# Patient Record
Sex: FEMALE | Race: WHITE | NOT HISPANIC OR LATINO | Employment: FULL TIME | URBAN - METROPOLITAN AREA
[De-identification: names, ages, dates, MRNs, and addresses within clinical notes are randomized per-mention and may not be internally consistent; named-entity substitution may affect disease eponyms.]

---

## 2017-10-24 ENCOUNTER — CONVERSION ENCOUNTER (OUTPATIENT)
Dept: OTHER | Facility: HOSPITAL | Age: 57
End: 2017-10-24

## 2018-02-19 ENCOUNTER — OFFICE VISIT CONVERTED (OUTPATIENT)
Dept: FAMILY MEDICINE CLINIC | Age: 58
End: 2018-02-19
Attending: NURSE PRACTITIONER

## 2018-04-18 ENCOUNTER — OFFICE VISIT CONVERTED (OUTPATIENT)
Dept: FAMILY MEDICINE CLINIC | Age: 58
End: 2018-04-18
Attending: NURSE PRACTITIONER

## 2018-04-19 LAB
ALBUMIN SERPL-MCNC: 4.6 G/DL
ALBUMIN/GLOB SERPL: 1.6 {RATIO}
ALP SERPL-CCNC: 96 IU/L
ALT SERPL-CCNC: 21 IU/L
AST SERPL-CCNC: 19 IU/L
BILIRUB SERPL-MCNC: 1.1 MG/DL
BUN SERPL-MCNC: 15 MG/DL
BUN/CREAT SERPL: 19
CALCIUM SERPL-MCNC: 10.1 MG/DL
CHLORIDE SERPL-SCNC: 101 MMOL/L
CHOLEST SERPL-MCNC: 197 MG/DL
CO2 SERPL-SCNC: 24 MMOL/L
CONV TOTAL PROTEIN: 7.4 G/DL
CREAT UR-MCNC: 0.77 MG/DL
GLOBULIN UR ELPH-MCNC: 2.8 G/DL
GLUCOSE SERPL-MCNC: 101 MG/DL
HDLC SERPL-MCNC: 35 MG/DL
LDLC SERPL CALC-MCNC: 115 MG/DL
POTASSIUM SERPL-SCNC: 4.9 MMOL/L
SODIUM SERPL-SCNC: 141 MMOL/L
TRIGL SERPL-MCNC: 235 MG/DL
VLDLC SERPL-MCNC: 47 MG/DL

## 2018-07-28 ENCOUNTER — OFFICE VISIT CONVERTED (OUTPATIENT)
Dept: FAMILY MEDICINE CLINIC | Age: 58
End: 2018-07-28
Attending: NURSE PRACTITIONER

## 2018-10-23 ENCOUNTER — OFFICE VISIT CONVERTED (OUTPATIENT)
Dept: FAMILY MEDICINE CLINIC | Age: 58
End: 2018-10-23
Attending: NURSE PRACTITIONER

## 2018-11-06 ENCOUNTER — CONVERSION ENCOUNTER (OUTPATIENT)
Dept: OTHER | Facility: HOSPITAL | Age: 58
End: 2018-11-06

## 2018-11-19 ENCOUNTER — CONVERSION ENCOUNTER (OUTPATIENT)
Dept: OTHER | Facility: HOSPITAL | Age: 58
End: 2018-11-19

## 2018-12-10 ENCOUNTER — OFFICE VISIT CONVERTED (OUTPATIENT)
Dept: FAMILY MEDICINE CLINIC | Age: 58
End: 2018-12-10
Attending: NURSE PRACTITIONER

## 2019-02-22 ENCOUNTER — OFFICE VISIT CONVERTED (OUTPATIENT)
Dept: FAMILY MEDICINE CLINIC | Age: 59
End: 2019-02-22
Attending: NURSE PRACTITIONER

## 2019-03-05 ENCOUNTER — OFFICE VISIT CONVERTED (OUTPATIENT)
Dept: FAMILY MEDICINE CLINIC | Age: 59
End: 2019-03-05
Attending: NURSE PRACTITIONER

## 2019-04-01 ENCOUNTER — HOSPITAL ENCOUNTER (OUTPATIENT)
Dept: OTHER | Facility: HOSPITAL | Age: 59
Discharge: HOME | End: 2019-04-01
Attending: NURSE PRACTITIONER

## 2019-04-01 LAB
ALBUMIN SERPL-MCNC: 4.4 G/DL (ref 3.5–5)
ALBUMIN/GLOB SERPL: 1.4 {RATIO} (ref 1.4–2.6)
ALP SERPL-CCNC: 82 U/L (ref 53–141)
ALT SERPL-CCNC: 15 U/L (ref 10–40)
ANION GAP SERPL CALC-SCNC: 16 MMOL/L (ref 8–19)
AST SERPL-CCNC: 16 U/L (ref 15–50)
BILIRUB SERPL-MCNC: 1.12 MG/DL (ref 0.2–1.3)
BUN SERPL-MCNC: 14 MG/DL (ref 5–25)
BUN/CREAT SERPL: 17 {RATIO} (ref 6–20)
CALCIUM SERPL-MCNC: 9.6 MG/DL (ref 8.7–10.4)
CHLORIDE SERPL-SCNC: 102 MMOL/L (ref 99–111)
CHOLEST SERPL-MCNC: 218 MG/DL (ref 107–200)
CHOLEST/HDLC SERPL: 6.1 {RATIO} (ref 3–6)
CONV CO2: 27 MMOL/L (ref 22–32)
CONV TOTAL PROTEIN: 7.5 G/DL (ref 6.3–8.2)
CREAT UR-MCNC: 0.83 MG/DL (ref 0.5–0.9)
GFR SERPLBLD BASED ON 1.73 SQ M-ARVRAT: >60 ML/MIN/{1.73_M2}
GLOBULIN UR ELPH-MCNC: 3.1 G/DL (ref 2–3.5)
GLUCOSE SERPL-MCNC: 104 MG/DL (ref 65–99)
HDLC SERPL-MCNC: 36 MG/DL (ref 40–60)
LDLC SERPL CALC-MCNC: 117 MG/DL (ref 70–100)
OSMOLALITY SERPL CALC.SUM OF ELEC: 293 MOSM/KG (ref 273–304)
POTASSIUM SERPL-SCNC: 4.4 MMOL/L (ref 3.5–5.3)
SODIUM SERPL-SCNC: 141 MMOL/L (ref 135–147)
TRIGL SERPL-MCNC: 323 MG/DL (ref 40–150)
VLDLC SERPL-MCNC: 65 MG/DL (ref 5–37)

## 2019-04-23 ENCOUNTER — OFFICE VISIT CONVERTED (OUTPATIENT)
Dept: FAMILY MEDICINE CLINIC | Age: 59
End: 2019-04-23
Attending: NURSE PRACTITIONER

## 2019-07-02 ENCOUNTER — HOSPITAL ENCOUNTER (OUTPATIENT)
Dept: OTHER | Facility: HOSPITAL | Age: 59
Discharge: HOME | End: 2019-07-02
Attending: NURSE PRACTITIONER

## 2019-07-02 LAB
ALBUMIN SERPL-MCNC: 4.4 G/DL (ref 3.5–5)
ALBUMIN/GLOB SERPL: 1.3 {RATIO} (ref 1.4–2.6)
ALP SERPL-CCNC: 86 U/L (ref 53–141)
ALT SERPL-CCNC: 17 U/L (ref 10–40)
ANION GAP SERPL CALC-SCNC: 18 MMOL/L (ref 8–19)
AST SERPL-CCNC: 18 U/L (ref 15–50)
BILIRUB SERPL-MCNC: 1.19 MG/DL (ref 0.2–1.3)
BUN SERPL-MCNC: 15 MG/DL (ref 5–25)
BUN/CREAT SERPL: 17 {RATIO} (ref 6–20)
CALCIUM SERPL-MCNC: 9.7 MG/DL (ref 8.7–10.4)
CHLORIDE SERPL-SCNC: 102 MMOL/L (ref 99–111)
CHOLEST SERPL-MCNC: 199 MG/DL (ref 107–200)
CHOLEST/HDLC SERPL: 5.4 {RATIO} (ref 3–6)
CONV CO2: 25 MMOL/L (ref 22–32)
CONV TOTAL PROTEIN: 7.7 G/DL (ref 6.3–8.2)
CREAT UR-MCNC: 0.88 MG/DL (ref 0.5–0.9)
EST. AVERAGE GLUCOSE BLD GHB EST-MCNC: 123 MG/DL
GFR SERPLBLD BASED ON 1.73 SQ M-ARVRAT: >60 ML/MIN/{1.73_M2}
GLOBULIN UR ELPH-MCNC: 3.3 G/DL (ref 2–3.5)
GLUCOSE SERPL-MCNC: 100 MG/DL (ref 65–99)
HBA1C MFR BLD: 5.9 % (ref 3.5–5.7)
HDLC SERPL-MCNC: 37 MG/DL (ref 40–60)
LDLC SERPL CALC-MCNC: 112 MG/DL (ref 70–100)
OSMOLALITY SERPL CALC.SUM OF ELEC: 293 MOSM/KG (ref 273–304)
POTASSIUM SERPL-SCNC: 4.2 MMOL/L (ref 3.5–5.3)
SODIUM SERPL-SCNC: 141 MMOL/L (ref 135–147)
TRIGL SERPL-MCNC: 249 MG/DL (ref 40–150)
VLDLC SERPL-MCNC: 50 MG/DL (ref 5–37)

## 2019-10-07 ENCOUNTER — OFFICE VISIT CONVERTED (OUTPATIENT)
Dept: FAMILY MEDICINE CLINIC | Age: 59
End: 2019-10-07
Attending: NURSE PRACTITIONER

## 2019-11-12 ENCOUNTER — HOSPITAL ENCOUNTER (OUTPATIENT)
Dept: OTHER | Facility: HOSPITAL | Age: 59
Discharge: HOME | End: 2019-11-12
Attending: NURSE PRACTITIONER

## 2019-11-12 ENCOUNTER — OFFICE VISIT CONVERTED (OUTPATIENT)
Dept: FAMILY MEDICINE CLINIC | Age: 59
End: 2019-11-12
Attending: NURSE PRACTITIONER

## 2019-11-18 LAB
CONV LAST MENSTURAL PERIOD: NORMAL
SPECIMEN SOURCE: NORMAL
SPECIMEN SOURCE: NORMAL
THIN PREP CVX: NORMAL

## 2019-11-25 ENCOUNTER — OFFICE VISIT CONVERTED (OUTPATIENT)
Dept: FAMILY MEDICINE CLINIC | Age: 59
End: 2019-11-25
Attending: NURSE PRACTITIONER

## 2020-04-22 ENCOUNTER — OFFICE VISIT CONVERTED (OUTPATIENT)
Dept: FAMILY MEDICINE CLINIC | Age: 60
End: 2020-04-22
Attending: NURSE PRACTITIONER

## 2020-04-23 ENCOUNTER — HOSPITAL ENCOUNTER (OUTPATIENT)
Dept: OTHER | Facility: HOSPITAL | Age: 60
Discharge: HOME | End: 2020-04-23
Attending: NURSE PRACTITIONER

## 2020-04-23 LAB
ALBUMIN SERPL-MCNC: 4.3 G/DL (ref 3.5–5)
ALBUMIN/GLOB SERPL: 1.4 {RATIO} (ref 1.4–2.6)
ALP SERPL-CCNC: 83 U/L (ref 53–141)
ALT SERPL-CCNC: 19 U/L (ref 10–40)
ANION GAP SERPL CALC-SCNC: 16 MMOL/L (ref 8–19)
AST SERPL-CCNC: 19 U/L (ref 15–50)
BILIRUB SERPL-MCNC: 1.28 MG/DL (ref 0.2–1.3)
BUN SERPL-MCNC: 20 MG/DL (ref 5–25)
BUN/CREAT SERPL: 23 {RATIO} (ref 6–20)
CALCIUM SERPL-MCNC: 9.6 MG/DL (ref 8.7–10.4)
CHLORIDE SERPL-SCNC: 101 MMOL/L (ref 99–111)
CHOLEST SERPL-MCNC: 214 MG/DL (ref 107–200)
CHOLEST/HDLC SERPL: 6.1 {RATIO} (ref 3–6)
CONV CO2: 26 MMOL/L (ref 22–32)
CONV TOTAL PROTEIN: 7.4 G/DL (ref 6.3–8.2)
CREAT UR-MCNC: 0.86 MG/DL (ref 0.5–0.9)
GFR SERPLBLD BASED ON 1.73 SQ M-ARVRAT: >60 ML/MIN/{1.73_M2}
GLOBULIN UR ELPH-MCNC: 3.1 G/DL (ref 2–3.5)
GLUCOSE SERPL-MCNC: 101 MG/DL (ref 65–99)
HDLC SERPL-MCNC: 35 MG/DL (ref 40–60)
LDLC SERPL CALC-MCNC: 132 MG/DL (ref 70–100)
OSMOLALITY SERPL CALC.SUM OF ELEC: 291 MOSM/KG (ref 273–304)
POTASSIUM SERPL-SCNC: 4.1 MMOL/L (ref 3.5–5.3)
SODIUM SERPL-SCNC: 139 MMOL/L (ref 135–147)
TRIGL SERPL-MCNC: 233 MG/DL (ref 40–150)
VLDLC SERPL-MCNC: 47 MG/DL (ref 5–37)

## 2020-10-19 ENCOUNTER — OFFICE VISIT CONVERTED (OUTPATIENT)
Dept: FAMILY MEDICINE CLINIC | Age: 60
End: 2020-10-19
Attending: NURSE PRACTITIONER

## 2020-10-19 ENCOUNTER — HOSPITAL ENCOUNTER (OUTPATIENT)
Dept: OTHER | Facility: HOSPITAL | Age: 60
Discharge: HOME | End: 2020-10-19
Attending: NURSE PRACTITIONER

## 2020-10-19 LAB
ALBUMIN SERPL-MCNC: 4.7 G/DL (ref 3.5–5)
ALBUMIN/GLOB SERPL: 1.5 {RATIO} (ref 1.4–2.6)
ALP SERPL-CCNC: 91 U/L (ref 53–141)
ALT SERPL-CCNC: 21 U/L (ref 10–40)
ANION GAP SERPL CALC-SCNC: 17 MMOL/L (ref 8–19)
AST SERPL-CCNC: 17 U/L (ref 15–50)
BASOPHILS # BLD AUTO: 0.04 10*3/UL (ref 0–0.2)
BASOPHILS NFR BLD AUTO: 0.4 % (ref 0–3)
BILIRUB SERPL-MCNC: 0.71 MG/DL (ref 0.2–1.3)
BUN SERPL-MCNC: 17 MG/DL (ref 5–25)
BUN/CREAT SERPL: 24 {RATIO} (ref 6–20)
CALCIUM SERPL-MCNC: 10.1 MG/DL (ref 8.7–10.4)
CHLORIDE SERPL-SCNC: 102 MMOL/L (ref 99–111)
CONV ABS IMM GRAN: 0.02 10*3/UL (ref 0–0.2)
CONV CO2: 23 MMOL/L (ref 22–32)
CONV IMMATURE GRAN: 0.2 % (ref 0–1.8)
CONV TOTAL PROTEIN: 7.8 G/DL (ref 6.3–8.2)
CREAT UR-MCNC: 0.71 MG/DL (ref 0.5–0.9)
DEPRECATED RDW RBC AUTO: 41.4 FL (ref 36.4–46.3)
EOSINOPHIL # BLD AUTO: 0.07 10*3/UL (ref 0–0.7)
EOSINOPHIL # BLD AUTO: 0.8 % (ref 0–7)
ERYTHROCYTE [DISTWIDTH] IN BLOOD BY AUTOMATED COUNT: 12.7 % (ref 11.7–14.4)
EST. AVERAGE GLUCOSE BLD GHB EST-MCNC: 128 MG/DL
GFR SERPLBLD BASED ON 1.73 SQ M-ARVRAT: >60 ML/MIN/{1.73_M2}
GLOBULIN UR ELPH-MCNC: 3.1 G/DL (ref 2–3.5)
GLUCOSE SERPL-MCNC: 97 MG/DL (ref 65–99)
HBA1C MFR BLD: 6.1 % (ref 3.5–5.7)
HCT VFR BLD AUTO: 40.1 % (ref 37–47)
HGB BLD-MCNC: 13.1 G/DL (ref 12–16)
LYMPHOCYTES # BLD AUTO: 2.18 10*3/UL (ref 1–5)
LYMPHOCYTES NFR BLD AUTO: 24.4 % (ref 20–45)
MCH RBC QN AUTO: 29 PG (ref 27–31)
MCHC RBC AUTO-ENTMCNC: 32.7 G/DL (ref 33–37)
MCV RBC AUTO: 88.7 FL (ref 81–99)
MONOCYTES # BLD AUTO: 0.4 10*3/UL (ref 0.2–1.2)
MONOCYTES NFR BLD AUTO: 4.5 % (ref 3–10)
NEUTROPHILS # BLD AUTO: 6.21 10*3/UL (ref 2–8)
NEUTROPHILS NFR BLD AUTO: 69.7 % (ref 30–85)
NRBC CBCN: 0 % (ref 0–0.7)
OSMOLALITY SERPL CALC.SUM OF ELEC: 287 MOSM/KG (ref 273–304)
PLATELET # BLD AUTO: 261 10*3/UL (ref 130–400)
PMV BLD AUTO: 10.1 FL (ref 9.4–12.3)
POTASSIUM SERPL-SCNC: 4.3 MMOL/L (ref 3.5–5.3)
RBC # BLD AUTO: 4.52 10*6/UL (ref 4.2–5.4)
SODIUM SERPL-SCNC: 138 MMOL/L (ref 135–147)
WBC # BLD AUTO: 8.92 10*3/UL (ref 4.8–10.8)

## 2021-04-26 ENCOUNTER — HOSPITAL ENCOUNTER (OUTPATIENT)
Dept: OTHER | Facility: HOSPITAL | Age: 61
Discharge: HOME | End: 2021-04-26
Attending: NURSE PRACTITIONER

## 2021-04-26 ENCOUNTER — OFFICE VISIT CONVERTED (OUTPATIENT)
Dept: FAMILY MEDICINE CLINIC | Age: 61
End: 2021-04-26
Attending: NURSE PRACTITIONER

## 2021-04-26 LAB
ANION GAP SERPL CALC-SCNC: 15 MMOL/L (ref 8–19)
BUN SERPL-MCNC: 16 MG/DL (ref 5–25)
BUN/CREAT SERPL: 20 {RATIO} (ref 6–20)
CALCIUM SERPL-MCNC: 9.4 MG/DL (ref 8.7–10.4)
CHLORIDE SERPL-SCNC: 100 MMOL/L (ref 99–111)
CONV CO2: 25 MMOL/L (ref 22–32)
CREAT UR-MCNC: 0.82 MG/DL (ref 0.5–0.9)
EST. AVERAGE GLUCOSE BLD GHB EST-MCNC: 128 MG/DL
GFR SERPLBLD BASED ON 1.73 SQ M-ARVRAT: >60 ML/MIN/{1.73_M2}
GLUCOSE SERPL-MCNC: 97 MG/DL (ref 65–99)
HBA1C MFR BLD: 6.1 % (ref 3.5–5.7)
OSMOLALITY SERPL CALC.SUM OF ELEC: 283 MOSM/KG (ref 273–304)
POTASSIUM SERPL-SCNC: 4.2 MMOL/L (ref 3.5–5.3)
SODIUM SERPL-SCNC: 136 MMOL/L (ref 135–147)

## 2021-05-18 NOTE — PROGRESS NOTES
Anabelle Coleman  1960     Office/Outpatient Visit    Visit Date:  03:01 pm    Provider: Catherine Burgos N.P. (Assistant: Antonieta Hull,  )    Location: [facility]        Electronically signed by Catherine Burgos N.P. on  2021 03:28:35 PM                             Subjective:        CC: Olga Lidia is a 60 year old White female.  This is a follow-up visit.  Med refill;         HPI:           Patient to be evaluated for essential (primary) hypertension.  Her current cardiac medication regimen includes an ACE inhibitor ( Zestril ).  Olga Lidia does not check her blood pressure other than at her clinic appointments.  She is tolerating the medication well without side effects.  Compliance with treatment has been good; she takes her medication as directed.  Exercises daily.      ROS:     CONSTITUTIONAL:  Negative for fever.      CARDIOVASCULAR:  Negative for chest pain, palpitations, tachycardia, orthopnea, and edema.      RESPIRATORY:  Negative for cough, dyspnea, and hemoptysis.      NEUROLOGICAL:  Negative for dizziness, headaches, paresthesias, and weakness.          Past Medical History / Family History / Social History:         Last Reviewed on 2021 03:09 PM by Catherine Burgos    Past Medical History:                 PAST MEDICAL HISTORY             GYNECOLOGICAL HISTORY:        Contraception: S/P tubal ligation;    Menopause at age 52.    Last Pap was 19         PREVENTIVE HEALTH MAINTENANCE             Hepatitis C Medicare Screening: was last done 10/2008 negative     MAMMOGRAM: Done within last 2 years and results in are chart was last done 19         Surgical History:         cyst removed from breast; Procedures: colonoscopy 12-31-10/hemorrhoids/diverticulosis         Family History:     Father: Coronary Artery Disease ( stents placed/3 V CABG ); Hypertension     Mother: Hypertension; Hyperlipidemia     Brother(s): 1 brother(s) total;  smoker      Sister(s): 4 sister(s) total; 1, was stillborn      Maternal Grandmother: Breast Cancer         Social History:     Occupation: [employer]      Marital Status:      Children: 2 children         Tobacco/Alcohol/Supplements:     Last Reviewed on 2021 03:05 PM by Antonieta Hull    Tobacco: She has never smoked.          Alcohol: Frequency: Socially         Supplements:  Patient admits to use of multivitamin.          Immunizations:     Havrix -adult dose (HepA) 5/15/2018    VAQTA -adult dose (HepA) 2018    Fluzone (3 + years dose) 2017    Fluzone Quadrivalent (3+ years) 10/9/2018    Fluzone Quadrivalent (3+ years) 2019    Afluria Quadrivalent 2018    Adacel (Tdap) 2008    COVID-19, mRNA, LNP-S, PF, 100 mcg/0.5 mL dose 3/5/2021    COVID-19, mRNA, LNP-S, PF, 100 mcg/0.5 mL dose 2021    influenza, injectable, quadrivalent, preservative free 10/1/2020        Allergies:     Last Reviewed on 2021 03:03 PM by Antonieta Hull    No Known Allergies.        Current Medications:     Last Reviewed on 2021 03:03 PM by Antonieta Hull    multivitamin daily     Lisinopril 10 mg oral tablet [1 tab daily]        Objective:        Vitals:         Current: 2021 3:13:23 PM    Ht:  5 ft, 1.25 in;  Wt: 143 lbs;  BMI: 26.8T: 98 F (temporal);  BP: 146/93 mm Hg (left arm, sitting);  P: 74 bpm (left arm (BP Cuff), sitting);  sCr: 0.71 mg/dL;  GFR: 79.45        Repeat:     3:22:56 PM  BP:   151/87mm Hg (right arm, sitting, HR: 71)     Exams:     PHYSICAL EXAM:     GENERAL: vital signs recorded - well developed, well nourished;  no apparent distress;     NECK: carotid exam reveals no bruits;     RESPIRATORY: normal respiratory rate and pattern with no distress; normal breath sounds with no rales, rhonchi, wheezes or rubs;     CARDIOVASCULAR: normal rate; rhythm is regular;  no systolic murmur; no edema;     PSYCHIATRIC:  appropriate affect and  demeanor; normal speech pattern; grossly normal memory;         Assessment:         I10   Essential (primary) hypertension           ORDERS:         Meds Prescribed:       [Refilled] Lisinopril 10 mg oral tablet [1 tab daily], #90 (ninety) tablets, Refills: 1 (one)         Lab Orders:       [id number]  [facility] Basic Metabolic Panel  (Send-Out)            [id number]  [facility] Hemoglobin A1C  (Send-Out)                      Plan:         Essential (primary) hypertensionreviewed last few labs, would prefer getting her labs done today, she is not fasting     LABORATORY:  Labs ordered to be performed today include basic metabolic panel and HgbA1C.      RECOMMENDATIONS given include: perform routine monitoring of blood pressure with home blood pressure cuff, exercise, reduction of dietary salt intake, and eat healthy.      FOLLOW-UP: pending labs           Prescriptions:       [Refilled] Lisinopril 10 mg oral tablet [1 tab daily], #90 (ninety) tablets, Refills: 1 (one)           Orders:       [id number]  [facility] Basic Metabolic Panel  (Send-Out)            16907  [facility] Hemoglobin A1C  (Send-Out)                  Patient Recommendations:        For  Essential (primary) hypertension:    Begin monitoring your blood pressure by brief nurse visits at our office, a home blood pressure monitor, or by checking on the machines in pharmacies or stores.  Keep a log of the readings. Maintain a regular exercise program. Reduce the amount of salt in your diet.              Charge Capture:         Primary Diagnosis:     I10  Essential (primary) hypertension           Orders:      [id number]  Office/outpatient visit; established patient, level 3  (In-House)

## 2021-05-18 NOTE — PROGRESS NOTES
Anabelle Coleman  1960     Office/Outpatient Visit    Visit Date:  01:36 pm    Provider: Catherine Burgos N.P. (Assistant: Kalli Rodriguez MA)    Location: [facility]        Electronically signed by Catherine Burgos N.P. on  2019 02:17:10 PM                             Subjective:        CC: Olga Lidia is a 59 year old White female.  possible infected finger nail;         HPI:           Patient complains of rash and other nonspecific skin eruption.  This has been a problem for the past 2-3 weeks.  It is located primarily around the fingernails.  She describes the rash as red.  Associated symptoms include medial third finger nail, lossened and has been red and throbs.  no injury to finger/ nail, has been using hydrogen peroxide and neosporin     ROS:     CONSTITUTIONAL:  Negative for fever.      INTEGUMENTARY/BREAST:  Negative for exudate.          Past Medical History / Family History / Social History:         Last Reviewed on 2019 01:50 PM by Catherine Burgos    Past Medical History:                 PAST MEDICAL HISTORY             GYNECOLOGICAL HISTORY:        Contraception: S/P tubal ligation;    Menopause at age 52.    Last Pap was 19         PREVENTIVE HEALTH MAINTENANCE             Hepatitis C Medicare Screening: was last done 10/2008 negative     MAMMOGRAM: Done within last 2 years and results in are chart was last done 19         Surgical History:         cyst removed from breast; Procedures: colonoscopy 12-31-10/hemorrhoids/diverticulosis         Family History:     Father: Coronary Artery Disease ( stents placed/3 V CABG ); Hypertension     Mother: Hypertension; Hyperlipidemia     Brother(s): 1 brother(s) total;  smoker     Sister(s): 4 sister(s) total; 1, was stillborn      Maternal Grandmother: Breast Cancer         Social History:     Occupation: [employer]      Marital Status:       Children: 2 children         Tobacco/Alcohol/Supplements:     Last Reviewed on 11/25/2019 01:39 PM by Kalli Rodriguez    Tobacco: She has never smoked.          Alcohol: Frequency: Socially         Supplements:  Patient admits to use of multivitamin.          Substance Abuse History:     Last Reviewed on 12/10/2018 03:51 PM by Kanika Limon        Mental Health History:     Last Reviewed on 12/10/2018 03:51 PM by Kanika Limon        Communicable Diseases (eg STDs):     Last Reviewed on 12/10/2018 03:51 PM by Kanika Limon        Immunizations:     Havrix -adult dose (HepA) 5/15/2018    VAQTA -adult dose (HepA) 11/21/2018    Fluzone (3 + years dose) 11/9/2017    Fluzone Quadrivalent (3+ years) 10/9/2018    Fluzone Quadrivalent (3+ years) 9/25/2019    Afluria Quadrivalent 9/17/2018    Adacel (Tdap) 4/22/2008        Allergies:     Last Reviewed on 11/12/2019 02:02 PM by Kalli Rodriguez    No Known Allergies.        Current Medications:     Last Reviewed on 11/12/2019 02:02 PM by Kalli Rodriguez    multivitamin daily    Lisinopril 10mg Tablet [1 tab daily]        Objective:        Vitals:         Current: 11/25/2019 1:40:30 PM    Ht:  5 ft, 1.25 in;  Wt: 136.6 lbs;  BMI: 25.6T: 98 F (oral);  BP: 129/73 mm Hg (left arm, sitting);  P: 70 bpm (left arm (BP Cuff), sitting);  sCr: 0.88 mg/dL;  GFR: 63.63        Exams:     PHYSICAL EXAM:     GENERAL:  well developed and nourished; appropriately groomed; in no apparent distress;     RESPIRATORY: normal respiratory rate and pattern with no distress; normal breath sounds with no rales, rhonchi, wheezes or rubs;     CARDIOVASCULAR: normal rate; rhythm is regular;  no systolic murmur;     BREAST/INTEGUMENT: right third finger, medially nail loosened, and mild erythema, no exudate, tender to palpation;         Assessment:         R21   Rash and other nonspecific skin eruption           ORDERS:         Meds Prescribed:       [New Rx] doxycycline hyclate  100 mg oral capsule [take 1 capsule (100 mg) by oral route 2 times per day], #20 (twenty) capsules, Refills: 0 (zero)                 Plan:         Rash and other nonspecific skin eruption        RECOMMENDATIONS given include: warm epsom salt soaks.      FOLLOW-UP: Advised to call if there is no improvement in 1 week(s).            Prescriptions:       [New Rx] doxycycline hyclate 100 mg oral capsule [take 1 capsule (100 mg) by oral route 2 times per day], #20 (twenty) capsules, Refills: 0 (zero)             Charge Capture:         Primary Diagnosis:     R21  Rash and other nonspecific skin eruption           Orders:      [id number]  Office/outpatient visit; established patient, level 2 (8 minutes)  (In-House)                  ADDENDUMS:      ____________________________________    Addendum: 12/04/2019 10:45 PM - Catherine Burgos        Total time spent with patient was 8 minutes

## 2021-05-18 NOTE — PROGRESS NOTES
Anabelle Coleman  1960     Office/Outpatient Visit    Visit Date: Tue, Nov 12, 2019 01:58 pm    Provider: Catherine Burgos N.P. (Assistant: Kalli Rodriguez MA)    Location: [facility]        Electronically signed by Catherine Burgos N.P. on  11/12/2019 03:02:33 PM                             Subjective:        CC: Olga Lidia is a 59 year old White female.  She presents with WWE.          HPI:           Concerning encounter for gynecological examination (general) (routine) without abnormal findings, she is menopausal.  She performs breast self-exams occasionally.   Her last Pap smear was in 12-16-16 and was normal.   Her last mammogram was in 11-7-18 and was abnormal; follow-up mammogram 11-19-18 normal right side.   Her last DEXA was in 12-12-12 and was normal.   Preventative Health updated today.  Olga Lidia has had an abnormal Pap smear in the past.  Tobacco: She has never smoked.  She is not sexually active.        (Mild)     PHQ-9 Depression Screening: Completed form scanned and in chart; Total Score 1     ROS:     CONSTITUTIONAL:  Negative for chills, fatigue, fever, and weight change.      EYES:  Negative for blurred vision, eye pain, and photophobia.      E/N/T:  Negative for hearing problems, E/N/T pain, congestion, rhinorrhea, epistaxis, hoarseness, and dental problems.      CARDIOVASCULAR:  Negative for chest pain, palpitations, tachycardia, orthopnea, and edema.      RESPIRATORY:  Negative for cough, dyspnea, and hemoptysis.      GASTROINTESTINAL:  Negative for melena and change in BM's.      GENITOURINARY:  Negative for dysuria and vaginal discharge.      MUSCULOSKELETAL:  Negative for arthralgias, back pain, and myalgias.      INTEGUMENTARY/BREAST:  Negative for atypical moles, dry skin, pruritis, rashes, breast masses, and nipple discharge.      NEUROLOGICAL:  Negative for dizziness, headaches, paresthesias, and weakness.      ENDOCRINE:  Negative for hair loss, heat/cold  intolerance, polydipsia, and polyphagia.      PSYCHIATRIC:  Negative for anxiety, depression, and sleep disturbances.          Past Medical History / Family History / Social History:         Last Reviewed on 2019 02:33 PM by Catherine Burgos    Past Medical History:                 PAST MEDICAL HISTORY             GYNECOLOGICAL HISTORY:        Contraception: S/P tubal ligation;    Menopause at age 52.          PREVENTIVE HEALTH MAINTENANCE             Hepatitis C Medicare Screening: was last done 10/2008 negative     MAMMOGRAM: Done within last 2 years and results in are chart was last done 2018 RIGHT DIAG MAMMO FINE         Surgical History:         cyst removed from breast; Procedures: colonoscopy 12-31-10/hemorrhoids/diverticulosis         Family History:     Father: Coronary Artery Disease ( stents placed/3 V CABG ); Hypertension     Mother: Hypertension; Hyperlipidemia     Brother(s): 1 brother(s) total;  smoker     Sister(s): 4 sister(s) total; 1, was stillborn      Maternal Grandmother: Breast Cancer         Social History:     Occupation: [employer]      Marital Status:      Children: 2 children         Tobacco/Alcohol/Supplements:     Last Reviewed on 2019 02:02 PM by Kalli Rodriguez    Tobacco: She has never smoked.          Alcohol: Frequency: Socially         Supplements:  Patient admits to use of multivitamin.          Substance Abuse History:     Last Reviewed on 12/10/2018 03:51 PM by Kanika Limon        Mental Health History:     Last Reviewed on 12/10/2018 03:51 PM by Kanika Limon        Communicable Diseases (eg STDs):     Last Reviewed on 12/10/2018 03:51 PM by Kanika Limon        Immunizations:     Havrix -adult dose (HepA) 5/15/2018    VAQTA -adult dose (HepA) 2018    Fluzone (3 + years dose) 2017    Fluzone Quadrivalent (3+ years) 10/9/2018    Fluzone Quadrivalent (3+ years) 2019     Afluria Quadrivalent 9/17/2018    Adacel (Tdap) 4/22/2008        Allergies:     Last Reviewed on 11/12/2019 02:02 PM by Kalli Rodriguez    No Known Allergies.        Current Medications:     Last Reviewed on 11/12/2019 02:02 PM by Kalli Rodriguez    multivitamin daily    Lisinopril 10mg Tablet [1 tab daily]        Objective:        Vitals:         Current: 11/12/2019 2:06:12 PM    Ht:  5 ft, 1.25 in;  Wt: 136.2 lbs;  BMI: 25.5T: 98.1 F (oral);  BP: 118/72 mm Hg (left arm, sitting);  P: 69 bpm (left arm (BP Cuff), sitting);  sCr: 0.88 mg/dL;  GFR: 63.55        Exams:     PHYSICAL EXAM:     GENERAL: vital signs recorded - well developed, well nourished;  no apparent distress;     EYES: extraocular movements intact; conjunctiva and cornea are normal; PERRLA;     E/N/T:  normal EACs, TMs, nasal/oral mucosa, teeth, gingiva, and oropharynx;     NECK: range of motion is normal; thyroid is non-palpable;     RESPIRATORY: normal respiratory rate and pattern with no distress; normal breath sounds with no rales, rhonchi, wheezes or rubs;     CARDIOVASCULAR: normal rate; rhythm is regular;  no systolic murmur; no edema;     GASTROINTESTINAL: nontender; normal bowel sounds; no organomegaly; rectal exam: normal tone; nontender, guaiac negative stool;     GENITOURINARY: Pap smear taken;  external genitalia: normal without lesions or urethral abnormalities;; vagina: atrophic mucosa; ;  cervix: normal appearance without lesions or discharge;;  uterus: normal size and position, well-supported;;  adnexa: normal with no masses or tenderness     LYMPHATIC: no enlargement of cervical or facial nodes; no axillary adenopathy;     BREAST/INTEGUMENT: BREASTS: breast exam is normal without masses, skin changes, or nipple discharge; SKIN: no significant rashes or lesions; no suspicious moles;     MUSCULOSKELETAL:  Normal range of motion, strength and tone;     NEUROLOGIC: GROSSLY INTACT     PSYCHIATRIC:  appropriate affect and  demeanor; normal speech pattern; grossly normal memory;         Assessment:         V72.31   Well Woman Exam       Z01.419   Encounter for gynecological examination (general) (routine) without abnormal findings       V79.0   Screening for depression   (Mild)     Z13.31   Encounter for screening for depression       Z12.12   Encounter for screening for malignant neoplasm of rectum           ORDERS:         Radiology/Test Orders:       [id number]  Screening digital breast tomosynthesis bi  (Send-Out)              Lab Orders:       [id number]  Cytopathology, slides, cervical or vaginal; manual screening under MD supervision  (Send-Out)            [id number]  Occult blood, fecal  (In-House)              Procedures Ordered:       [id number]  Handlg&/or convey of spec for TR office to lab  (In-House)              Other Orders:         Depression screen negative  (In-House)                      Plan:         Well Woman Examshe has an appt at 3 today for her mammogram         RADIOLOGY:  I have ordered Mammogram Bilateral Screening 3D to be done today.      COUNSELING was provided today regarding the following topics: breast self-exam.            Orders:       [id number]  Cytopathology, slides, cervical or vaginal; manual screening under MD supervision  (Send-Out)            [id number]  Screening digital breast tomosynthesis bi  (Send-Out)            12788  Handlg&/or convey of spec for TR office to lab  (In-House)              Encounter for screening for depression    MIPS PHQ-9 Depression Screening: Completed form scanned and in chart; Total Score 1; Negative Depression Screen           Orders:         Depression screen negative  (In-House)              Encounter for screening for malignant neoplasm of rectum          Orders:       [id number]  Occult blood, fecal  (In-House)      X 1 @ [facility]            Patient Recommendations:        For  Well Woman Exam:        You should regularly examine your breasts, easily done while in the shower or with  lotion.  Feel and look for differences in consistency from month to month, especially noting knots or lumps, changes in skin appearance, nipple retraction or discharge.              Charge Capture:         Primary Diagnosis:     V72.31  Well Woman Exam           Orders:      [id number]  Preventive medicine, established patient, age 40-64 years  (In-House)            31972  Handlg&/or convey of spec for TR office to lab  (In-House)              Z01.419  Encounter for gynecological examination (general) (routine) without abnormal findings     V79.0  Screening for depression     Z13.31  Encounter for screening for depression           Orders:        Depression screen negative  (In-House)              Z12.12  Encounter for screening for malignant neoplasm of rectum           Orders:      [id number]  Occult blood, fecal  (In-House)

## 2021-05-18 NOTE — PROGRESS NOTES
Anabelle Coleman 1960     Office/Outpatient Visit    Visit Date: Mon, Dec 10, 2018 03:32 pm    Provider: Kanika Limon N.P. (Assistant: Breanne Knox MA)    Location: [facility]        Electronically signed by Kanika Limon N.P. on  12/10/2018 04:40:53 PM                             SUBJECTIVE:        CC:     Olga Lidia is a 58 year old White female.  presents today due to complaints of cough X 2 weeks         HPI:         Patient complains of cough.  States productive green cough x 2 weeks. Denies fever, headache, ear pain, or sore throat. Taking claritin, nyquil, delsym without relief.      ROS:     CONSTITUTIONAL:  Negative for chills, fatigue, fever, and weight change.      EYES:  Negative for blurred vision.      CARDIOVASCULAR:  Negative for chest pain, orthopnea, paroxysmal nocturnal dyspnea and pedal edema.      RESPIRATORY:  Negative for dyspnea.      GASTROINTESTINAL:  Negative for abdominal pain, constipation, diarrhea, nausea and vomiting.      NEUROLOGICAL:  Negative for dizziness, headaches, paresthesias, and weakness.      PSYCHIATRIC:  Negative for anxiety, depression, and sleep disturbances.          PMH/FMH/SH:     Last Reviewed on 12/10/2018 03:51 PM by Kanika Limon    Past Medical History:                 PAST MEDICAL HISTORY             GYNECOLOGICAL HISTORY:        Contraception: S/P tubal ligation;    Menopause at age 52.          PREVENTIVE HEALTH MAINTENANCE             Hepatitis C Medicare Screening: was last done 10/2008 negative     MAMMOGRAM: Done within last 2 years and results in are chart was last done 2018 RIGHT DIAG MAMMO FINE         Surgical History:         cyst removed from breast; Procedures: colonoscopy 12-31-10/hemorrhoids/diverticulosis         Family History:     Father: Coronary Artery Disease ( stents placed/3 V CABG ); Hypertension     Mother: Hypertension; Hyperlipidemia     Brother(s): 1 brother(s) total;  smoker     Sister(s):  4 sister(s) total; 1, was stillborn      Maternal Grandmother: Breast Cancer         Social History:     Occupation: [employer]      Marital Status:      Children: 2 children         Tobacco/Alcohol/Supplements:     Last Reviewed on 12/10/2018 03:51 PM by Kanika Limon    Tobacco: She has never smoked.          Alcohol: Frequency: Socially         Supplements:  Patient admits to use of multivitamin.          Substance Abuse History:     Last Reviewed on 12/10/2018 03:51 PM by Kanika Limon        Mental Health History:     Last Reviewed on 12/10/2018 03:51 PM by Kanika Limon        Communicable Diseases (eg STDs):     Last Reviewed on 12/10/2018 03:51 PM by Kanika Limon            Current Problems:     Last Reviewed on 12/10/2018 03:51 PM by Kanika Limon    Urinary frequency     Microscopic hematuria     Pre-diabetes     Hypertension     Finger pain     Hemorrhoids, external     Screening for breast cancer, unspecified         Immunizations:     Havrix -adult dose (HepA) 5/15/2018     Fluzone (3 + years dose) 2017     Fluzone Quadrivalent (3+ years) 10/9/2018     Afluria Quadrivalent 2018     Adacel (Tdap) 2008         Allergies:     Last Reviewed on 12/10/2018 03:51 PM by Kanika Limon      No Known Drug Allergies.         Current Medications:     Last Reviewed on 12/10/2018 03:51 PM by Kanika Limon    Lisinopril 10mg Tablet 1 tab daily     multivitamin daily         OBJECTIVE:        Vitals:         Current: 12/10/2018 3:39:43 PM    Ht:  5 ft, 1.25 in;  Wt: 139.4 lbs;  BMI: 26.1    T: 97.9 F (oral);  BP: 141/82 mm Hg (right arm, sitting);  P: 73 bpm (left arm (BP Cuff), sitting);  sCr: 0.91 mg/dL;  GFR: 62.81        Exams:     PHYSICAL EXAM:     GENERAL: vital signs recorded - well developed, well nourished;  no apparent distress;     EYES: extraocular movements intact; conjunctiva and cornea are normal; PERRLA;      E/N/T: EARS: external auditory canal normal;  NOSE: nasal mucosa is erythematous;  no sinus tenderness; OROPHARYNX: oral mucosa reveals erythema;     NECK: range of motion is normal; thyroid is non-palpable;     RESPIRATORY: normal respiratory rate and pattern with no distress; decreased breath sounds throughout;     CARDIOVASCULAR: normal rate; rhythm is regular;  no systolic murmur; no edema;     GASTROINTESTINAL: nontender; normal bowel sounds; no organomegaly;     NEUROLOGICAL:  cranial nerves, motor and sensory function, reflexes, gait and coordination are all intact;     PSYCHIATRIC:  appropriate affect and demeanor; normal speech pattern; grossly normal memory;         ASSESSMENT:           466.0   J20.9  Acute bronchitis              DDx:         ORDERS:         Meds Prescribed:       Augmentin (Amoxicillin/Clavulanate) 875mg/125mg Tablet 1 tab bid X 10 days  #20 (Twenty) tablet(s) Refills: 0       Medrol (Methylprednisolone) 4mg Dosepak Take as directed with food  #1 (One) dose pack Refills: 0                 PLAN:          Acute bronchitis         FOLLOW-UP: Advised to call if there is no improvement..   Chronic visit follow up           Prescriptions:       Augmentin (Amoxicillin/Clavulanate) 875mg/125mg Tablet 1 tab bid X 10 days  #20 (Twenty) tablet(s) Refills: 0       Medrol (Methylprednisolone) 4mg Dosepak Take as directed with food  #1 (One) dose pack Refills: 0           Patient Education Handouts:       Acute Bronchitis              Patient Recommendations:        For  Acute bronchitis:                     APPOINTMENT INFORMATION:        Monday Tuesday Wednesday Thursday Friday Saturday Sunday            Time:___________________AM  PM   Date:_____________________             CHARGE CAPTURE:           Primary Diagnosis:     466.0 Acute bronchitis            J20.9    Acute bronchitis, unspecified              Orders:          73295   Office/outpatient visit; established patient, level 3   (In-House)

## 2021-05-18 NOTE — PROGRESS NOTES
Anabelle ColemanAbbi 1960     Office/Outpatient Visit    Visit Date:  08:49 am    Provider: Catherine Burgos N.P. (Assistant: Ella Burgos MA)    Location: [facility]        Electronically signed by Catherine Burgos N.P. on  2018 11:11:25 AM                             SUBJECTIVE:        CC:     Olga Lidia is a 57 year old White female.  Patient is here for routine check up and medication refills.;         HPI:         Olga Lidia presents with hypertension.  Her current cardiac medication regimen includes an ACE inhibitor ( Lisinopril ).  Olga Lidia does not check her blood pressure other than at her clinic appointments.  Compliance with treatment has been good; she takes her medication as directed, maintains her diet and exercise regimen, and follows up as directed.      ROS:     CONSTITUTIONAL:  Negative for chills, fatigue, fever, and weight change.      CARDIOVASCULAR:  Negative for chest pain, palpitations, tachycardia, orthopnea, and edema.      RESPIRATORY:  Negative for cough, dyspnea, and hemoptysis.      NEUROLOGICAL:  Negative for dizziness, headaches, paresthesias, and weakness.          PMH/FMH/SH:     Last Reviewed on 2018 09:06 AM by Catherine Burgos    Past Medical History:                 PAST MEDICAL HISTORY             GYNECOLOGICAL HISTORY:        Contraception: S/P tubal ligation;    Menopause at age 52.          PREVENTIVE HEALTH MAINTENANCE             MAMMOGRAM: Done within last 2 years and results in are chart was last done 10- stable         Surgical History:         cyst removed from breast; Procedures: colonoscopy 12-31-10/hemorrhoids/diverticulosis         Family History:     Father: Coronary Artery Disease ( stents placed/3 V CABG ); Hypertension     Mother: Hypertension; Hyperlipidemia     Brother(s): 1 brother(s) total;  smoker     Sister(s): 4 sister(s) total; 1, was stillborn      Maternal Grandmother: Breast Cancer          Social History:     Occupation: [employer]      Marital Status:      Children: 2 children         Tobacco/Alcohol/Supplements:     Last Reviewed on 4/18/2018 09:07 AM by Catherine Burgos    Tobacco: She has never smoked.          Alcohol: Frequency: Socially         Supplements:  Patient admits to use of multivitamin.              Immunizations:     Fluzone (3 + years dose) 11/9/2017     Adacel (Tdap) 4/22/2008         Allergies:     Last Reviewed on 4/18/2018 09:06 AM by Catherine Burgos      No Known Drug Allergies.         Current Medications:     Last Reviewed on 4/18/2018 09:06 AM by Catherine Burgos    Lisinopril 10mg Tablet 1 tab daily     co q 10 daily     multivitamin daily         OBJECTIVE:        Vitals:         Current: 4/18/2018 8:55:15 AM    Ht:  5 ft, 1.25 in;  Wt: 139.2 lbs;  BMI: 26.1    T: 97.8 F (oral);  BP: 134/84 mm Hg (left arm, sitting);  P: 64 bpm (left arm (BP Cuff), sitting);  sCr: 0.8 mg/dL;  GFR: 72.25        Exams:     PHYSICAL EXAM:     GENERAL: vital signs recorded - well developed, well nourished;  no apparent distress;     NECK: carotid exam reveals no bruits;     RESPIRATORY: normal respiratory rate and pattern with no distress; normal breath sounds with no rales, rhonchi, wheezes or rubs;     CARDIOVASCULAR: normal rate; rhythm is regular;  no systolic murmur; no edema;     PSYCHIATRIC:  appropriate affect and demeanor; normal speech pattern; grossly normal memory;         ASSESSMENT           401.1   I10  Hypertension              DDx:         ORDERS:         Meds Prescribed:       Refill of: Lisinopril 10mg Tablet 1 tab daily  #90 (Ninety) tablet(s) Refills: 1         Lab Orders:       [id number]  [id number] [facility] Comp Metabolic Panel (14)  (Send-Out)         [id number]  [id number] [facility] Lipid Panel (Excludes LDL/HDL ratio)  (Send-Out)                   PLAN:          Hypertension Advised her to make a follow up appointment for a well woman exam.      LABORATORY:  [facility]  CMP Lipid panel     RECOMMENDATIONS given include: perform routine monitoring of blood pressure with home blood pressure cuff, reduction of dietary salt intake, and Advised about free BP checks on the last Saturday of each month.      FOLLOW-UP: Schedule a follow-up visit in 6 months.            Prescriptions:       Refill of: Lisinopril 10mg Tablet 1 tab daily  #90 (Ninety) tablet(s) Refills: 1           Orders:       [id number]  [id number] [facility] Comp Metabolic Panel (14)  (Send-Out)         [id number]  [id number] [facility] Lipid Panel (Excludes LDL/HDL ratio)  (Send-Out)             Patient Education Handouts:       Hyperlipidemia (Hyperlipoproteinemia)              Patient Recommendations:        For  Hypertension:     Begin monitoring your blood pressure by brief nurse visits at our office, a home blood pressure monitor, or by checking on the machines in pharmacies or stores.  Keep a log of the readings. Reduce the amount of salt in your diet.  Schedule a follow-up visit in 6 months.              CHARGE CAPTURE           **Please note: ICD descriptions below are intended for billing purposes only and may not represent clinical diagnoses**        Primary Diagnosis:         401.1 Hypertension            I10    Essential (primary) hypertension              Orders:          [id number]   Office/outpatient visit; established patient, level 3  (In-House)

## 2021-05-18 NOTE — PROGRESS NOTES
Anabelle ColemanAbbi 1960     Office/Outpatient Visit    Visit Date:  01:46 pm    Provider: Anthony Simmons N.P. (Assistant: Fatmata Wilburn MA)    Location: [facility]        Electronically signed by Anthony Simmons N.P. on  2018 02:54:44 PM                             SUBJECTIVE:        CC:     Olga Lidia is a 57 year old White female.  urinary frequency, passing blood in urine, back pain;         HPI:         Olga Lidia presents with urinary frequency.  This began within the last 3 days.  Associated symptoms include flank pain, hematuria, urgency and urinary frequency.  She denies associated chills, fever or urinary incontinence.  Olga Lidia states that this is the first time she has experienced this kind of discomfort.  Medical history is pertinent for hx HTN.  She denies a history of recurrent UTIs.      ROS:     CONSTITUTIONAL:  Negative for chills and fever.      CARDIOVASCULAR:  Negative for chest pain, orthopnea, paroxysmal nocturnal dyspnea and pedal edema.      RESPIRATORY:  Negative for dyspnea.      GASTROINTESTINAL:  Negative for abdominal pain, constipation, diarrhea, nausea and vomiting.      GENITOURINARY:  Positive for urgency and frequent urination.   Negative for dysuria or frequent UTI's.      MUSCULOSKELETAL:  Positive for pain over right kiidney area.          PMH/FM/SH:     Last Reviewed on 2018 02:14 PM by Anthony Simmons    Past Medical History:                 PAST MEDICAL HISTORY             GYNECOLOGICAL HISTORY:        Contraception: S/P tubal ligation;    Menopause at age 52.          PREVENTIVE HEALTH MAINTENANCE             MAMMOGRAM: Done within last 2 years and results in are chart was last done 10- stable         Surgical History:         cyst removed from breast; Procedures: colonoscopy 12-31-10/hemorrhoids/diverticulosis         Family History:     Father: Coronary Artery Disease ( stents placed/3 V CABG ); Hypertension      Mother: Hypertension; Hyperlipidemia     Brother(s): 1 brother(s) total;  smoker     Sister(s): 4 sister(s) total; 1, was stillborn      Maternal Grandmother: Breast Cancer         Social History:     Occupation: [employer]      Marital Status:      Children: 2 children         Tobacco/Alcohol/Supplements:     Last Reviewed on 2018 02:14 PM by Anthony Simmons    Tobacco: She has never smoked.          Alcohol: Frequency: Socially         Supplements:  Patient admits to use of multivitamin.              Current Problems:     Last Reviewed on 2018 02:14 PM by Anthony Simmons    Urinary frequency     Pre-diabetes     Hypertension     Finger pain     Hemorrhoids, external         Immunizations:     Fluzone (3 + years dose) 2017     Adacel (Tdap) 2008         Allergies:     Last Reviewed on 2018 02:14 PM by Anthony Simmons      No Known Drug Allergies.         Current Medications:     Last Reviewed on 2018 02:14 PM by Anthony Simmons    Lisinopril 10mg Tablet 1 tab daily     multivitamin daily     co q 10 daily         OBJECTIVE:        Vitals:         Current: 2018 1:48:42 PM    Ht:  5 ft, 1.25 in;  Wt: 139.8 lbs;  BMI: 26.2    T: 98.5 F (oral);  BP: 157/91 mm Hg (left arm, sitting);  P: 70 bpm (left arm (BP Cuff), sitting);  sCr: 0.8 mg/dL;  GFR: 72.38        Repeat:     1:52:23 PM     BP:   152/88mm Hg (left arm, sitting)         Exams:     PHYSICAL EXAM:     GENERAL: vital signs recorded - well developed, well nourished;  no apparent distress;     RESPIRATORY: normal respiratory rate and pattern with no distress; normal breath sounds with no rales, rhonchi, wheezes or rubs;     CARDIOVASCULAR: normal rate; rhythm is regular;  no systolic murmur; no edema;     GASTROINTESTINAL: nontender; normal bowel sounds; no organomegaly;     GENITOURINARY: Right mild CVA tenderenss;         Lab/Test Results:             Glucose, Urine:   Neg (02/19/2018),     Bilirubin, urine:  Negative (02/19/2018),     Ketones, Urine Strip:  Negative (02/19/2018),     Specific Gravity, urine:  1.020 (02/19/2018),     Blood in Urine:  trace (02/19/2018),     pH, urine:  6.0 (02/19/2018),     Protein Urine QL:  negative (02/19/2018),     Urobilinogen, urine:  0.2 E.U./dL (02/19/2018),     Nitrite, Urine:  Negative (02/19/2018),     Leukoctyes, urine:  Negative (02/19/2018),     Appearance:  Clear (02/19/2018),     collection source:  Clean-catch (02/19/2018),     Color:  Yellow (02/19/2018),     Performed by::  atc (02/19/2018),             ASSESSMENT:           599.0   N39.0  UTI              DDx:     599.72   R31.29  Microscopic hematuria              DDx:         ORDERS:         Meds Prescribed:       Macrobid (Nitrofurantoin) 100mg Capsules one BID x 7 days  #14 (Fourteen) capsule(s) Refills: 0         Lab Orders:       [id number]  Urinalysis, automated, without microscopy  (In-House)         [id number]  [facility] Urine Culture  (Send-Out)                   PLAN:          UTI         RECOMMENDATIONS given include: Further recommendation to be given after test results are complete and If culture negative and pain does not improve may need additonal testing for a kidney stone..            Prescriptions:       Macrobid (Nitrofurantoin) 100mg Capsules one BID x 7 days  #14 (Fourteen) capsule(s) Refills: 0           Orders:       [id number]  Urinalysis, automated, without microscopy  (In-House)         14677  [facility] Urine Culture  (Send-Out)            Microscopic hematuria Will need to repeat Urine due to blood in 2 weeks. dmt             CHARGE CAPTURE:           Primary Diagnosis:     599.0 UTI            N39.0    Urinary tract infection, site not specified              Orders:          [id number]   Office/outpatient visit; established patient, level 3  (In-House)             20047   Urinalysis, automated, without microscopy  (In-House)           599.72 Microscopic hematuria             R31.29    Other microscopic hematuria

## 2021-05-18 NOTE — PROGRESS NOTES
Anabelle ColemanAbbi 1960     Office/Outpatient Visit    Visit Date:  01:58 pm    Provider: Catherine Burgos N.P. (Assistant: Kalli Rodriguez MA)    Location: [facility]        Electronically signed by Catherine Burgos N.P. on  2019 03:44:10 PM                             SUBJECTIVE:        CC:     Olga Lidia is a 58 year old White female.  This is a follow-up visit.  on lab work;         HPI:         Patient to be evaluated for hypertension.  Her current cardiac medication regimen includes an ACE inhibitor ( Zestril ).  She is tolerating the medication well without side effects.  Compliance with treatment has been good; she takes her medication as directed.      ROS:     CONSTITUTIONAL:  Negative for chills, fatigue, fever, and weight change.      CARDIOVASCULAR:  Negative for chest pain, palpitations, tachycardia, orthopnea, and edema.      RESPIRATORY:  Negative for cough, dyspnea, and hemoptysis.      NEUROLOGICAL:  Negative for dizziness, headaches, paresthesias, and weakness.          PMH/FM/:     Last Reviewed on 2019 02:33 PM by Catherine Burgos    Past Medical History:                 PAST MEDICAL HISTORY             GYNECOLOGICAL HISTORY:        Contraception: S/P tubal ligation;    Menopause at age 52.          PREVENTIVE HEALTH MAINTENANCE             Hepatitis C Medicare Screening: was last done 10/2008 negative     MAMMOGRAM: Done within last 2 years and results in are chart was last done 2018 RIGHT DIAG MAMMO FINE         Family History:     Father: Coronary Artery Disease ( stents placed/3 V CABG ); Hypertension     Mother: Hypertension; Hyperlipidemia     Brother(s): 1 brother(s) total;  smoker     Sister(s): 4 sister(s) total; 1, was stillborn      Maternal Grandmother: Breast Cancer         Social History:     Occupation: [employer]      Marital Status:      Children: 2 children          Tobacco/Alcohol/Supplements:     Last Reviewed on 4/23/2019 02:00 PM by Kalli Rodriguez    Tobacco: She has never smoked.          Alcohol: Frequency: Socially         Supplements:  Patient admits to use of multivitamin.              Immunizations:     Havrix -adult dose (HepA) 5/15/2018     VAQTA -adult dose (HepA) 11/21/2018     Fluzone (3 + years dose) 11/9/2017     Fluzone Quadrivalent (3+ years) 10/9/2018     Afluria Quadrivalent 9/17/2018     Adacel (Tdap) 4/22/2008         Allergies:     Last Reviewed on 3/05/2019 03:19 PM by Kalli Rodriguez      No Known Drug Allergies.         Current Medications:     Last Reviewed on 3/05/2019 03:19 PM by Kalli Rodriguez    Lisinopril 10mg Tablet 1 tab daily     multivitamin daily         OBJECTIVE:        Vitals:         Current: 4/23/2019 2:02:26 PM    Ht:  5 ft, 1.25 in;  Wt: 134.8 lbs;  BMI: 25.3    T: 98.6 F (oral);  BP: 133/82 mm Hg (left arm, sitting);  P: 67 bpm (left arm (BP Cuff), sitting);  sCr: 0.83 mg/dL;  GFR: 67.89        Exams:     PHYSICAL EXAM:     GENERAL: vital signs recorded - well developed, well nourished;  no apparent distress;     NECK: carotid exam reveals no bruits;     RESPIRATORY: normal respiratory rate and pattern with no distress; normal breath sounds with no rales, rhonchi, wheezes or rubs;     CARDIOVASCULAR: normal rate; rhythm is regular;  no systolic murmur; no edema;     PSYCHIATRIC:  appropriate affect and demeanor; normal speech pattern; grossly normal memory;         ASSESSMENT           401.1   I10  Hypertension              DDx:     790.29   R73.01  Pre-diabetes              DDx:     272.4   E78.5  Hyperlipidemia              DDx:         ORDERS:         Meds Prescribed:       Refill of: Lisinopril 10mg Tablet 1 tab daily  #90 (Ninety) tablet(s) Refills: 1         Lab Orders:       FUTURE  Future order to be done at patients convenience  (Send-Out)         [id number]  [address] Hemoglobin A1C  (Send-Out)         FUTURE   Future order to be done at patients convenience  (Send-Out)         [address]  [facility] [id number], [id number]  (Send-Out)                   PLAN:          Hypertension         RECOMMENDATIONS given include: exercise.            Prescriptions:       Refill of: Lisinopril 10mg Tablet 1 tab daily  #90 (Ninety) tablet(s) Refills: 1          Pre-diabetes reviewed recent labs with patient, to work on her diet and exercise         FOLLOW-UP TESTING #1: FOLLOW-UP LABORATORY:  Labs to be scheduled in the future include HgbA1C.            Orders:       FUTURE  Future order to be done at patients convenience  (Send-Out)         609[facility]  [facility]  ([facility]-Out)             Patient Education Handouts:       Non-Insulin Dependent Diabetes Mellitus (NIDDM)           Hyperlipidemia reviewed her labs and previous labs and risk for CAD, patient would like to avoid taking any other rx's, will work on her diet and exercise, recheck labs in 2 months /order given to patient         FOLLOW-UP TESTING #1: FOLLOW-UP LABORATORY:  Labs to be scheduled in the future include HTN/Lipid Panel: CMP, Lipid.      RECOMMENDATIONS given include: exercise and low cholesterol/low fat diet.      FOLLOW-UP: Schedule a follow-up visit in 2 months. fasting for labs           Orders:       FUTURE  Future order to be done at patients convenience  (Send-Out)         03786  [facility] [id number], [id number]  (Send-Out)             Patient Education Handouts:       High Triglyceride Levels (Hypertriglyceridemia)              Patient Recommendations:        For  Hypertension:     Maintain a regular exercise program.          For  Pre-diabetes:             The following laboratory testing has been ordered: HgbA1C         For  Hyperlipidemia:             The following laboratory testing has been ordered: Maintain a regular exercise program. Reduce the amount of cholesterol and saturated fat in your diet.  Schedule a follow-up visit in 2  months.              CHARGE CAPTURE           **Please note: ICD descriptions below are intended for billing purposes only and may not represent clinical diagnoses**        Primary Diagnosis:         401.1 Hypertension            I10    Essential (primary) hypertension              Orders:          04101   Office/outpatient visit; established patient, level 3  (In-House)           790.29 Pre-diabetes            R73.01    Impaired fasting glucose    272.4 Hyperlipidemia            E78.5    Hyperlipidemia, unspecified        ADDENDUMS:      ____________________________________    Addendum: 06/27/2019 11:46 AM - Jaja Molina        TRC: re: tickle: Labs to be done per 04/23/19 office visit note\PB

## 2021-05-18 NOTE — PROGRESS NOTES
Anabelle ColemanAbbi 1960     Office/Outpatient Visit    Visit Date: Mon, Oct 7, 2019 01:46 pm    Provider: Catherine Burgos N.P. (Assistant: Mercedes Peters MA)    Location: [facility]        Electronically signed by Catherine Burgos N.P. on  10/07/2019 02:12:46 PM                             SUBJECTIVE:        CC:     Olga Lidia is a 59 year old White female.  This is a follow-up visit.  check up;         HPI:         Additionally, she presents with history of hypertension.  her current cardiac medication regimen includes an ACE inhibitor ( Zestril ).  Olga Lidia does not check her blood pressure other than at her clinic appointments.  She is tolerating the medication well without side effects.  Compliance with treatment has been good; she takes her medication as directed.      ROS:     CONSTITUTIONAL:  Negative for chills, fatigue, fever, and weight change.      CARDIOVASCULAR:  Negative for chest pain, palpitations, tachycardia, orthopnea, and edema.      RESPIRATORY:  Negative for cough, dyspnea, and hemoptysis.      INTEGUMENTARY/BREAST:  Positive for performance of self breast exams.  due mammogram next month     NEUROLOGICAL:  Negative for dizziness, headaches, paresthesias, and weakness.          PMH/FMH/SH:     Last Reviewed on 10/07/2019 02:10 PM by Catherine Burgos    Past Medical History:                 PAST MEDICAL HISTORY             GYNECOLOGICAL HISTORY:        Contraception: S/P tubal ligation;    Menopause at age 52.          PREVENTIVE HEALTH MAINTENANCE             Hepatitis C Medicare Screening: was last done 10/2008 negative     MAMMOGRAM: Done within last 2 years and results in are chart was last done 2018 RIGHT DIAG MAMMO FINE         Surgical History:         cyst removed from breast; Procedures: colonoscopy 12-31-10/hemorrhoids/diverticulosis         Family History:     Father: Coronary Artery Disease ( stents placed/3 V CABG ); Hypertension     Mother: Hypertension;  Hyperlipidemia     Brother(s): 1 brother(s) total;  smoker     Sister(s): 4 sister(s) total; 1, was stillborn      Maternal Grandmother: Breast Cancer         Social History:     Occupation: [employer]      Marital Status:      Children: 2 children         Tobacco/Alcohol/Supplements:     Last Reviewed on 10/07/2019 01:50 PM by Mercedes Peters    Tobacco: She has never smoked.          Alcohol: Frequency: Socially         Supplements:  Patient admits to use of multivitamin.              Immunizations:     Havrix -adult dose (HepA) 5/15/2018     VAQTA -adult dose (HepA) 2018     Fluzone (3 + years dose) 2017     Fluzone Quadrivalent (3+ years) 10/9/2018     Afluria Quadrivalent 2018     Adacel (Tdap) 2008         Allergies:     Last Reviewed on 10/07/2019 01:50 PM by Mercedes Peters      No Known Drug Allergies.         Current Medications:     Last Reviewed on 10/07/2019 01:50 PM by Mercedes Peters    Lisinopril 10mg Tablet 1 tab daily     multivitamin daily         OBJECTIVE:        Vitals:         Current: 10/7/2019 1:52:04 PM    Ht:  5 ft, 1.25 in;  Wt: 137.6 lbs;  BMI: 25.8    T: 98.2 F (oral);  BP: 124/79 mm Hg (left arm, sitting);  P: 67 bpm (left arm (BP Cuff), sitting);  sCr: 0.88 mg/dL;  GFR: 63.83        Exams:     PHYSICAL EXAM:     GENERAL:  well developed and nourished; appropriately groomed; in no apparent distress;     NECK: carotid exam reveals no bruits;     RESPIRATORY: normal respiratory rate and pattern with no distress; normal breath sounds with no rales, rhonchi, wheezes or rubs;     CARDIOVASCULAR: normal rate; rhythm is regular;  no systolic murmur; no edema;     PSYCHIATRIC:  appropriate affect and demeanor; normal speech pattern; grossly normal memory;         ASSESSMENT           V79.0   Z13.31  Screening for depression              DDx:     401.1   I10  Hypertension              DDx:         ORDERS:         Meds  Prescribed:       Refill of: Lisinopril 10mg Tablet 1 tab daily  #90 (Ninety) tablet(s) Refills: 1         Other Orders:         Depression screen negative  (In-House)                   PLAN:          Screening for depression got her flu vaccine at work last week     MIPS PHQ-9 Depression Screening: Completed form scanned and in chart; Total Score 2; Negative Depression Screen           Orders:         Depression screen negative  (In-House)            Hypertension reviewed last few labs with patient, to continue efforts with diet and exercise (last pap was , to follow up for pap )         FOLLOW-UP:.  :for Well Woman Exam and to schedule her mammogram for the same day     RECOMMENDATIONS given include: perform routine monitoring of blood pressure with home blood pressure cuff and work on diet and regular exercise.            Prescriptions:       Refill of: Lisinopril 10mg Tablet 1 tab daily  #90 (Ninety) tablet(s) Refills: 1             Patient Recommendations:        For  Hypertension:                     APPOINTMENT INFORMATION:        Monday Tuesday Wednesday Thursday Friday Saturday Sunday            Time:___________________AM  PM   Date:_____________________ Begin monitoring your blood pressure by brief nurse visits at our office, a home blood pressure monitor, or by checking on the machines in pharmacies or stores.  Keep a log of the readings.              CHARGE CAPTURE           **Please note: ICD descriptions below are intended for billing purposes only and may not represent clinical diagnoses**        Primary Diagnosis:         V79.0 Screening for depression            Z13.31    Encounter for screening for depression              Orders:          76481   Office/outpatient visit; established patient, level 3  (In-House)                Depression screen negative  (In-House)           401.1 Hypertension            I10    Essential (primary) hypertension

## 2021-05-18 NOTE — PROGRESS NOTES
Anabelle Coleman 1960     Office/Outpatient Visit    Visit Date:  02:06 pm    Provider: Ritika Chance N.P. (Assistant: Natalie Landaverde RN)    Location: [facility]        Electronically signed by Ritika Chance N.P. on  2019 02:46:12 PM                             SUBJECTIVE:        CC:     Olga Lidia is a 58 year old White female.  Fatigue and cough;         HPI:         Olga Lidia presents with upper respiratory illness.  These have been present for the past 4 days.  The symptoms include productive cough, nasal congestion and fatigue.  She denies fever.  She has already tried to relieve the symptoms with Theraflu.      ROS:     CONSTITUTIONAL:  Positive for fatigue.   Negative for fever.      EYES:  Negative for eye drainage and eye pain.      E/N/T:  Positive for nasal congestion.   Negative for ear pain or sore throat.      CARDIOVASCULAR:  Negative for chest pain and palpitations.      RESPIRATORY:  Positive for recent cough.   Negative for dyspnea or frequent wheezing.      GASTROINTESTINAL:  Negative for abdominal pain and vomiting.          PM/[facility]/:     Last Reviewed on 12/10/2018 03:51 PM by Kanika Limon    Past Medical History:                 PAST MEDICAL HISTORY             GYNECOLOGICAL HISTORY:        Contraception: S/P tubal ligation;    Menopause at age 52.          PREVENTIVE HEALTH MAINTENANCE             Hepatitis C Medicare Screening: was last done 10/2008 negative     MAMMOGRAM: Done within last 2 years and results in are chart was last done 2018 RIGHT DIAG MAMMO FINE         Surgical History:         cyst removed from breast; Procedures: colonoscopy 12-31-10/hemorrhoids/diverticulosis         Family History:     Father: Coronary Artery Disease ( stents placed/3 V CABG ); Hypertension     Mother: Hypertension; Hyperlipidemia     Brother(s): 1 brother(s) total;  smoker     Sister(s): 4 sister(s) total; 1, was stillborn      Maternal  Grandmother: Breast Cancer         Social History:     Occupation: [employer]      Marital Status:      Children: 2 children         Tobacco/Alcohol/Supplements:     Last Reviewed on 12/10/2018 03:51 PM by Kanika Limon    Tobacco: She has never smoked.          Alcohol: Frequency: Socially         Supplements:  Patient admits to use of multivitamin.          Substance Abuse History:     Last Reviewed on 12/10/2018 03:51 PM by Kanika Limon        Mental Health History:     Last Reviewed on 12/10/2018 03:51 PM by Kanika Limon        Communicable Diseases (eg STDs):     Last Reviewed on 12/10/2018 03:51 PM by Kanika Limon            Current Problems:     Last Reviewed on 12/10/2018 03:51 PM by Kanika Limon    Urinary frequency     Microscopic hematuria     Pre-diabetes     Hypertension     Hemorrhoids, external         Immunizations:     Havrix -adult dose (HepA) 5/15/2018     VAQTA -adult dose (HepA) 11/21/2018     Fluzone (3 + years dose) 11/9/2017     Fluzone Quadrivalent (3+ years) 10/9/2018     Afluria Quadrivalent 9/17/2018     Adacel (Tdap) 4/22/2008         Allergies:     Last Reviewed on 2/22/2019 02:08 PM by Natalie Landaverde      No Known Drug Allergies.         Current Medications:     Last Reviewed on 2/22/2019 02:08 PM by Natalie Landaverde    Lisinopril 10mg Tablet 1 tab daily     multivitamin daily         OBJECTIVE:        Vitals:         Current: 2/22/2019 2:11:43 PM    Ht:  5 ft, 1.25 in;  Wt: 137.2 lbs;  BMI: 25.7    T: 97.7 F (oral);  BP: 151/79 mm Hg (right arm, sitting);  P: 69 bpm (right arm (BP Cuff), sitting);  sCr: 0.91 mg/dL;  GFR: 62.39        Repeat:     2:29:13 PM     BP:   139/87mm Hg (right arm, sitting)     2:29:23 PM     P:   74bpm (right arm (BP Cuff), sitting)         Exams:     PHYSICAL EXAM:     GENERAL: well developed, well nourished;  no apparent distress, appears minimally ill;     EYES: PERRL, EOMI     E/N/T: EARS:  external auditory canal normal;  both TMs are dull;  NOSE: normal turbinates; no sinus tenderness; OROPHARYNX: oral mucosa is normal; posterior pharynx shows no exudate and post nasal drip;     NECK: range of motion is normal; trachea is midline;     RESPIRATORY: normal respiratory rate and pattern with no distress; normal breath sounds with no rales, rhonchi, wheezes or rubs;     CARDIOVASCULAR: normal rate; rhythm is regular;     MUSCULOSKELETAL: normal gait;     NEUROLOGIC: mental status: alert and oriented x 3; GROSSLY INTACT     PSYCHIATRIC: appropriate affect and demeanor;         Lab/Test Results:             Influenza A:  Positive (02/22/2019),     Influenza B:  Negative (02/22/2019),     Performed by::  tls (02/22/2019),             ASSESSMENT           488.89   J09.X9  Influenza due to identified novel influenza A virus with other manifestations              DDx:         ORDERS:         Lab Orders:       [id number]  Infectious agent antigen detection by immunoassay; Influenza  (In-House)         54153  Infectious agent antigen detection by immunoassay; Influenza  (In-House)                   PLAN:          Influenza due to identified novel influenza A virus with other manifestations Declined rx for cough medication at time of visit. Will continue with Theraflu. Ibuprofen and Tylenol per package instructions.         RECOMMENDATIONS given include: Push Fluids, Rest, Follow up if no improvement or worsening symptoms like high fevers, vomiting, weakness, or increasing shortness of air.    .      FOLLOW-UP: Schedule follow-up appointments on a p.r.n. basis. Chronic visit follow up           Orders:       [id number]  Infectious agent antigen detection by immunoassay; Influenza  (In-House)         [id number]  Infectious agent antigen detection by immunoassay; Influenza  (In-House)               Patient Recommendations:        For  Influenza due to identified novel influenza A virus with other manifestations:     Schedule  follow-up appointments as needed.              CHARGE CAPTURE           **Please note: ICD descriptions below are intended for billing purposes only and may not represent clinical diagnoses**        Primary Diagnosis:         488.89 Influenza due to identified novel influenza A virus with other manifestations            J09.X9    Influenza due to identified novel influenza A virus with other manifestations              Orders:          02028   Office/outpatient visit; established patient, level 3  (In-House)             34558 -[id number]  Infectious agent antigen detection by immunoassay; Influenza  (In-House)             18264   Infectious agent antigen detection by immunoassay; Influenza  (In-House)

## 2021-05-18 NOTE — PROGRESS NOTES
"Anabelle Coleman 1960     Office/Outpatient Visit    Visit Date: Tue, Mar 5, 2019 03:17 pm    Provider: Arlen Rowan N.P. (Assistant: Kalli Rodriguez MA)    Location: [facility]        Electronically signed by Arlen Rowan N.P. on  2019 09:20:14 PM                             SUBJECTIVE:        CC:     Olga Lidia is a 58 year old White female.  This is a follow-up visit.  still coughing; sinus symptoms;         HPI:         Olga Lidia presents with uRI.  These have been present for the past 2 weeks.  The symptoms include progressive, productive cough, \"sinus\" headache, nasal congestion and purulent nasal discharge.  She denies fever or wheezing.  She denies exposure to ill contacts.  She has already tried to relieve the symptoms with mixed cold/sinus preparations.  Medical history is significant for type A flu dx here .      ROS:     CONSTITUTIONAL:  Positive for fatigue ( improved ).      E/N/T:  Positive for nasal congestion and frequent rhinorrhea.   Negative for sore throat.      CARDIOVASCULAR:  Negative for chest pain, palpitations, tachycardia, orthopnea, and edema.      RESPIRATORY:  Positive for recent cough.   Negative for frequent wheezing.      GASTROINTESTINAL:  Negative for abdominal pain, heartburn, constipation, diarrhea, and stool changes.      MUSCULOSKELETAL:  Negative for arthralgias, back pain, and myalgias.      NEUROLOGICAL:  Positive for headaches ( \"sinus\" ).   Negative for dizziness.          PMH/FMH/SH:     Last Reviewed on 12/10/2018 03:51 PM by Kanika Limon    Past Medical History:                 PAST MEDICAL HISTORY             GYNECOLOGICAL HISTORY:        Contraception: S/P tubal ligation;    Menopause at age 52.          PREVENTIVE HEALTH MAINTENANCE             Hepatitis C Medicare Screening: was last done 10/2008 negative     MAMMOGRAM: Done within last 2 years and results in are chart was last done 2018 RIGHT DIAG MAMMO FINE         " Surgical History:         cyst removed from breast; Procedures: colonoscopy 12-31-10/hemorrhoids/diverticulosis         Family History:     Father: Coronary Artery Disease ( stents placed/3 V CABG ); Hypertension     Mother: Hypertension; Hyperlipidemia     Brother(s): 1 brother(s) total;  smoker     Sister(s): 4 sister(s) total; 1, was stillborn      Maternal Grandmother: Breast Cancer         Social History:     Occupation: [employer]      Marital Status:      Children: 2 children         Tobacco/Alcohol/Supplements:     Last Reviewed on 2019 02:09 PM by Natalie Landaverde    Tobacco: She has never smoked.          Alcohol: Frequency: Socially         Supplements:  Patient admits to use of multivitamin.          Substance Abuse History:     Last Reviewed on 12/10/2018 03:51 PM by Kanika Limon        Mental Health History:     Last Reviewed on 12/10/2018 03:51 PM by Kanika Limon        Communicable Diseases (eg STDs):     Last Reviewed on 12/10/2018 03:51 PM by Kanika Limon            Current Problems:     Last Reviewed on 12/10/2018 03:51 PM by Kanika Limon    Urinary frequency     Microscopic hematuria     Pre-diabetes     Hypertension     Hemorrhoids, external     Influenza due to identified novel influenza A virus with other manifestations         Immunizations:     Havrix -adult dose (HepA) 5/15/2018     VAQTA -adult dose (HepA) 2018     Fluzone (3 + years dose) 2017     Fluzone Quadrivalent (3+ years) 10/9/2018     Afluria Quadrivalent 2018     Adacel (Tdap) 2008         Allergies:     Last Reviewed on 2019 02:08 PM by Natalie Landaverde      No Known Drug Allergies.         Current Medications:     Last Reviewed on 3/05/2019 03:19 PM by Kalli Rodriguez    Lisinopril 10mg Tablet 1 tab daily     multivitamin daily         OBJECTIVE:        Vitals:         Historical:     2019  BP:   139/87 mm Hg ( (right  arm, , sitting, );)     02/22/2019  Wt:   137.2lbs        Current: 3/5/2019 3:21:25 PM    Ht:  5 ft, 1.25 in;  Wt: 136 lbs;  BMI: 25.5    T: 98.4 F (oral);  BP: 152/75 mm Hg (left arm, sitting);  P: 70 bpm (left arm (BP Cuff), sitting);  sCr: 0.91 mg/dL;  GFR: 62.15        Repeat:     3:31:48 PM     BP:   150/76mm Hg (left arm, sitting)     3:55:17 PM     BP:   158/78mm Hg (left arm, sitting)         Exams:     PHYSICAL EXAM:     GENERAL:  well developed and nourished; appropriately groomed; in no apparent distress;     E/N/T: EARS: the left TM is has fluid behind it and right TM is normal;  NOSE: nasal mucosa is erythematous;  bilateral frontal sinus tenderness present;     RESPIRATORY: normal respiratory rate and pattern with no distress; normal breath sounds with no rales, rhonchi, wheezes or rubs;     CARDIOVASCULAR: normal rate; rhythm is regular;     LYMPHATIC: no enlargement of cervical or facial nodes;     MUSCULOSKELETAL:  Normal range of motion, strength and tone;     NEUROLOGIC: mental status: alert and oriented x 3; GROSSLY INTACT     PSYCHIATRIC:  appropriate affect and demeanor; normal speech pattern; grossly normal memory;         ASSESSMENT           461.1   J01.10  Acute frontal sinusitis              DDx:         ORDERS:         Meds Prescribed:       Doxycycline Monohydrate 100mg Tablet Take 1 tablet(s) by mouth bid  #14 (Fourteen) tablet(s) Refills: 0                 PLAN: free bp check in one week          Acute frontal sinusitis         RECOMMENDATIONS given include: increase oral fluid intake and follow up if not improving..      takes bp medication in am.  advise coridicidin hbp and monitor bp at home.  free bp check in one week           Prescriptions:       Doxycycline Monohydrate 100mg Tablet Take 1 tablet(s) by mouth bid  #14 (Fourteen) tablet(s) Refills: 0             Patient Recommendations:        For  Acute frontal sinusitis:     Increase oral fluid intake.              CHARGE CAPTURE            **Please note: ICD descriptions below are intended for billing purposes only and may not represent clinical diagnoses**        Primary Diagnosis:         461.1 Acute frontal sinusitis            J01.10    Acute frontal sinusitis, unspecified              Orders:          09925   Office/outpatient visit; established patient, level 3  (In-House)

## 2021-05-18 NOTE — PROGRESS NOTES
Anabelle Coleman  1960     Office/Outpatient Visit    Visit Date: Mon, Oct 19, 2020 02:30 pm    Provider: Catherine Burgos N.P. (Assistant: Glenis Bright LPN)    Location: [facility]        Electronically signed by Catherine Burgos N.P. on  10/19/2020 03:59:20 PM                             Subjective:        CC: Olga Lidia is a 60 year old White female.  This is a follow-up visit.          HPI:           PHQ-9 Depression Screening: Completed form scanned and in chart; Total Score 0           In regard to the essential (primary) hypertension, current nonpharmacologic treatment includes low sodium diet and exercise.  Her current cardiac medication regimen includes an ACE inhibitor ( Lisinopril 10 mg ).  Compliance with treatment has been good; she takes her medication as directed, maintains her diet and exercise regimen, and follows up as directed.  She is tolerating the medication well without side effects.  Olga Lidia does not check her blood pressure other than at her clinic appointments.            Additionally, she presents with history of hyperlipidemia, unspecified.  current treatment includes none.  Most recent lab tests include Total Cholesterol:  214 (mg/dL) (04/23/2020), HDL:  35 (mg/dL) (04/23/2020), Triglycerides:  233 (mg/dL) (04/23/2020), LDL:  132 (mg/dL) (04/23/2020), Glucose, Serum:  101 (mg/dL) (04/23/2020), ALT (SGPT):  19 (U/L) (04/23/2020), AST (SGOT):  19 (U/L) (04/23/2020).            With regard to the dizziness and giddiness, she describes the sensation as lightheadedness.  The frequency of the episodes is approximately 6-8 times per year.  (had an episode this am) The average duration of each episode is less than one minute.  She states that it is worsened with getting up from bed in the morning.  The dizziness improves with lying still.  Associated symptoms include clammy/sweating.      ROS:     CONSTITUTIONAL:  Negative for chills and fever.      CARDIOVASCULAR:   Negative for chest pain and palpitations.      RESPIRATORY:  Negative for recent cough and dyspnea.      NEUROLOGICAL:  Negative for headaches.          Past Medical History / Family History / Social History:         Last Reviewed on 10/19/2020 02:39 PM by Catherine Burgos    Past Medical History:                 PAST MEDICAL HISTORY             GYNECOLOGICAL HISTORY:        Contraception: S/P tubal ligation;    Menopause at age 52.    Last Pap was 19         PREVENTIVE HEALTH MAINTENANCE             Hepatitis C Medicare Screening: was last done 10/2008 negative     MAMMOGRAM: Done within last 2 years and results in are chart was last done 19         Surgical History:         cyst removed from breast; Procedures: colonoscopy 12-31-10/hemorrhoids/diverticulosis         Family History:     Father: Coronary Artery Disease ( stents placed/3 V CABG ); Hypertension     Mother: Hypertension; Hyperlipidemia     Brother(s): 1 brother(s) total;  smoker     Sister(s): 4 sister(s) total; 1, was stillborn      Maternal Grandmother: Breast Cancer         Social History:     Occupation: [employer]      Marital Status:      Children: 2 children         Tobacco/Alcohol/Supplements:     Last Reviewed on 10/19/2020 02:34 PM by Glenis Bright    Tobacco: She has never smoked.          Alcohol: Frequency: Socially         Supplements:  Patient admits to use of multivitamin.          Immunizations:     Havrix -adult dose (HepA) 5/15/2018    VAQTA -adult dose (HepA) 2018    Fluzone (3 + years dose) 2017    Fluzone Quadrivalent (3+ years) 10/9/2018    Fluzone Quadrivalent (3+ years) 2019    Afluria Quadrivalent 2018    Adacel (Tdap) 2008        Allergies:     Last Reviewed on 10/19/2020 02:39 PM by Catherine Burgos    No Known Allergies.        Current Medications:     Last Reviewed on 10/19/2020 02:39 PM by Catherine Burgos     multivitamin daily     Lisinopril 10 mg oral tablet [1 tab daily]        Objective:        Vitals:         Current: 10/19/2020 2:36:26 PM    Ht:  5 ft, 1.25 in;  Wt: 141 lbs;  BMI: 26.4T: 97.2 F (oral);  BP: 155/84 mm Hg (left arm, sitting);  P: 77 bpm (left arm (BP Cuff), sitting);  sCr: 0.86 mg/dL;  GFR: 65.20        Repeat:     3:6:47 PM  BP:   138/98mm Hg (right arm, sitting)     Exams:     PHYSICAL EXAM:     GENERAL: vital signs recorded - well developed, well nourished;  no apparent distress;     NECK: carotid exam reveals no bruits;     RESPIRATORY: normal respiratory rate and pattern with no distress; normal breath sounds with no rales, rhonchi, wheezes or rubs;     CARDIOVASCULAR: normal rate; rhythm is regular;  no systolic murmur; no edema;     NEUROLOGIC: GROSSLY INTACT     PSYCHIATRIC:  appropriate affect and demeanor; normal speech pattern; grossly normal memory;         Assessment:         Z13.31   Encounter for screening for depression       I10   Essential (primary) hypertension       E78.5   Hyperlipidemia, unspecified       R42   Dizziness and giddiness           ORDERS:         Meds Prescribed:       [Refilled] Lisinopril 10 mg oral tablet [1 tab daily], #90 (ninety) tablets, Refills: 1 (one)         Lab Orders:       [id number]  COMP - [facility] Comp. Metabolic Panel  (Send-Out)            77871  A1CEG - [facility] Hemoglobin A1C  (Send-Out)            70387  BDCBC - [facility] CBC with 3 part diff  (Send-Out)              Other Orders:         Depression screen negative  (In-House)                      Plan:         Encounter for screening for depression    MIPS PHQ-9 Depression Screening: Completed form scanned and in chart; Total Score 0; Negative Depression Screen           Orders:         Depression screen negative  (In-House)              Essential (primary) hypertensionBP log and instructions were given to patient / encouraged her to monitor BP at home and to bring BP log in with her at her next visit      LABORATORY:  Labs ordered to be performed today include Comprehensive metabolic panel and HgbA1C.      RECOMMENDATIONS given include: perform routine monitoring of blood pressure with home blood pressure cuff, exercise, reduction of dietary salt intake, and take medication as prescribed, try not to miss doses.      FOLLOW-UP: pending labs           Prescriptions:       [Refilled] Lisinopril 10 mg oral tablet [1 tab daily], #90 (ninety) tablets, Refills: 1 (one)           Orders:       [id number]  COMP [facility] Comp. Metabolic Panel  (Send-Out)            84652  A1CEG - [facility] Hemoglobin A1C  (Send-Out)              Hyperlipidemia, unspecifiedShe was not fasting today / she works M-F and does not have time to come before or after work for fasting labs        RECOMMENDATIONS given include: exercise and low cholesterol/low fat diet.      FOLLOW-UP: pending labs /will check Lipids in future         Dizziness and giddiness    LABORATORY:  Labs ordered to be performed today include CBC.      RECOMMENDATIONS given include: maintain good  blood pressure control, limit salt in diet, rest whenever possible while symptoms persist, and get up slowing when changing positions in the morning.      FOLLOW-UP: pending labs           Orders:       [id number]  BDCB - [facility] CBC with 3 part diff  (Send-Out)                  Patient Recommendations:        For  Essential (primary) hypertension:    Begin monitoring your blood pressure by brief nurse visits at our office, a home blood pressure monitor, or by checking on the machines in pharmacies or stores.  Keep a log of the readings. Maintain a regular exercise program. Reduce the amount of salt in your diet.          For  Hyperlipidemia, unspecified:    Maintain a regular exercise program. Reduce the amount of cholesterol and saturated fat in your diet.          For  Dizziness and giddiness:    Get plenty of rest.              Charge Capture:         Primary Diagnosis:     Z13.31  Encounter for  screening for depression           Orders:      59120  Office/outpatient visit; established patient, level 4  (In-House)              Depression screen negative  (In-House)              I10  Essential (primary) hypertension     E78.5  Hyperlipidemia, unspecified     R42  Dizziness and giddiness

## 2021-05-18 NOTE — PROGRESS NOTES
Anabelle Coleman  1960     Office/Outpatient Visit    Visit Date: Wed, Apr 22, 2020 09:47 am    Provider: Catherine Burgos N.P. (Assistant: Criss Shepherd MA)    Location: [facility]        Electronically signed by Catherine Burgos N.P. on  04/22/2020 11:29:11 AM                             Subjective:        CC: CONSENT BY MNP     VIDEO/ DOXIMITY [phone] is a 59 year old White female.  This is a follow-up visit.          HPI: TELEMEDICINE VISIT:    - Olga Lidia  consented to this telemedicine visit.    - Persons present during the telemedicine consultation include: Olga Lidia - patient, LAWRENCE Burgos APRN    - This visit is being conducted over FaceTime with audio and video.                    Patient to be evaluated for essential (primary) hypertension.  Her current cardiac medication regimen includes an ACE inhibitor ( Prinivil ).  She is tolerating the medication well without side effects.  Compliance with treatment has been good; she takes her medication as directed.            Additionally, she presents with history of hyperlipidemia, unspecified.  current treatment includes some efforts to follow a low cholesterol diet.  Most recent lab tests include Glucose, Serum:  100 (mg/dL) (07/02/2019), HDL:  37 (mg/dL) (07/02/2019), LDL:  112 (mg/dL) (07/02/2019), Total Cholesterol:  199 (mg/dL) (07/02/2019), Triglycerides:  249 (mg/dL) (07/02/2019), Weight (lb):  136.6 (11/25/2019), Systolic BP:  129 (11/25/2019), Diastolic BP:  73 (11/25/2019), Pulse:  70 (11/25/2019).  She  tries to eat healthy and get exercise but not doing as well as she should.      ROS:     CONSTITUTIONAL:  Negative for fever.      CARDIOVASCULAR:  Negative for chest pain, palpitations, tachycardia, orthopnea, and edema.      RESPIRATORY:  Negative for recent cough and dyspnea.      NEUROLOGICAL:  Negative for dizziness, headaches, paresthesias, and weakness.          Past Medical History / Family History / Social  History:         Last Reviewed on 2020 10:00 AM by Catherine Burgos    Past Medical History:                 PAST MEDICAL HISTORY             GYNECOLOGICAL HISTORY:        Contraception: S/P tubal ligation;    Menopause at age 52.    Last Pap was 19         PREVENTIVE HEALTH MAINTENANCE             Hepatitis C Medicare Screening: was last done 10/2008 negative     MAMMOGRAM: Done within last 2 years and results in are chart was last done 19         Surgical History:         cyst removed from breast; Procedures: colonoscopy 12-31-10/hemorrhoids/diverticulosis         Family History:     Father: Coronary Artery Disease ( stents placed/3 V CABG ); Hypertension     Mother: Hypertension; Hyperlipidemia     Brother(s): 1 brother(s) total;  smoker     Sister(s): 4 sister(s) total; 1, was stillborn      Maternal Grandmother: Breast Cancer         Social History:     Occupation: [employer] of [employer]      Marital Status:      Children: 2 children         Tobacco/Alcohol/Supplements:     Last Reviewed on 2020 09:48 AM by Criss Shepherd    Tobacco: She has never smoked.          Alcohol: Frequency: Socially         Supplements:  Patient admits to use of multivitamin.          Immunizations:     Havrix -adult dose (HepA) 5/15/2018    VAQTA -adult dose (HepA) 2018    Fluzone (3 + years dose) 2017    Fluzone Quadrivalent (3+ years) 10/9/2018    Fluzone Quadrivalent (3+ years) 2019    Afluria Quadrivalent 2018    Adacel (Tdap) 2008        Allergies:     Last Reviewed on 2020 09:48 AM by Criss Shepherd    No Known Allergies.        Current Medications:     Last Reviewed on 2020 09:48 AM by Criss Shepherd    multivitamin daily     Lisinopril 10 mg oral tablet [1 tab daily]        Objective:        Exams:     PHYSICAL EXAM:     GENERAL: vital signs recorded - well developed, well nourished;  no apparent distress;      RESPIRATORY: normal appearance and symmetric expansion of chest wall;     NEUROLOGIC: GROSSLY INTACT     PSYCHIATRIC:  appropriate affect and demeanor; normal speech pattern; grossly normal memory;         Assessment:         I10   Essential (primary) hypertension       E78.5   Hyperlipidemia, unspecified           ORDERS:         Meds Prescribed:       [Refilled] Lisinopril 10 mg oral tablet [1 tab daily], #90 (ninety) tablets, Refills: 1 (one)         Lab Orders:       FUTURE  Future order to be done at patients convenience  (Send-Out)            [id number]  [facility] CMP AND LIPID: [id number]  (Send-Out)                      Plan:         Essential (primary) hypertension    Telehealth: Verbal consent obtained for visit to occur via televideo conferencing     FOLLOW-UP TESTING #1: FOLLOW-UP LABORATORY:  Labs to be scheduled in the future include HTN/Lipid Panel: CMP, Lipid.      RECOMMENDATIONS given include: perform routine monitoring of blood pressure with home blood pressure cuff.      FOLLOW-UP: fasting for labs, order to , she will  today and return fasting           Prescriptions:       [Refilled] Lisinopril 10 mg oral tablet [1 tab daily], #90 (ninety) tablets, Refills: 1 (one)           Orders:       FUTURE  Future order to be done at patients convenience  (Send-Out)            19175  [facility] CMP AND LIPID: [id number]  (Send-Out)              Hyperlipidemia, unspecified        RECOMMENDATIONS given include: exercise and low cholesterol/low fat diet.      FOLLOW-UP: fasting for labs             Patient Recommendations:        For  Essential (primary) hypertension:            The following laboratory testing has been ordered: Begin monitoring your blood pressure by brief nurse visits at our office, a home blood pressure monitor, or by checking on the machines in pharmacies or stores.  Keep a log of the readings.          For  Hyperlipidemia, unspecified:    Maintain a regular exercise  program. Reduce the amount of cholesterol and saturated fat in your diet.              Charge Capture:         Primary Diagnosis:     I10  Essential (primary) hypertension           Orders:      [id number]  Office/outpatient visit; established patient, level 3  (In-House)              E78.5  Hyperlipidemia, unspecified

## 2021-05-18 NOTE — PROGRESS NOTES
Anabelle ColemanAbbi 1960     Office/Outpatient Visit    Visit Date: Tue, Oct 23, 2018 01:54 pm    Provider: Catherine Burgos N.P. (Assistant: Mo Fry)    Location: [facility]        Electronically signed by Catherine Burgos N.P. on  10/23/2018 02:58:41 PM                             SUBJECTIVE:        CC:     Olga Lidia is a 58 year old White female.  This is a follow-up visit.  medication refills;         HPI:         Patient presents with hypertension.  Her current cardiac medication regimen includes an ACE inhibitor ( Zestril ).  Olga Lidia does not check her blood pressure other than at her clinic appointments.  She is tolerating the medication well without side effects.  Compliance with treatment has been good; she takes her medication as directed.  Ran out of rx today.      ROS:     CONSTITUTIONAL:  Negative for chills, fatigue, fever, and weight change.      CARDIOVASCULAR:  Negative for chest pain, palpitations, tachycardia, orthopnea, and edema.      RESPIRATORY:  Negative for cough, dyspnea, and hemoptysis.      INTEGUMENTARY/BREAST:  Positive for performance of self breast exams ( on a monthly basis ).   Negative for breast mass.  asked about getting her mammogram     NEUROLOGICAL:  Negative for dizziness, headaches, paresthesias, and weakness.          PMH/FMH/SH:     Last Reviewed on 10/23/2018 02:20 PM by Catherine Burgos    Past Medical History:                 PAST MEDICAL HISTORY             GYNECOLOGICAL HISTORY:        Contraception: S/P tubal ligation;    Menopause at age 52.          PREVENTIVE HEALTH MAINTENANCE             Hepatitis C Medicare Screening: was last done 10/2008 negative     MAMMOGRAM: Done within last 2 years and results in are chart was last done 10- stable         Surgical History:         cyst removed from breast; Procedures: colonoscopy 12-31-10/hemorrhoids/diverticulosis         Family History:     Father: Coronary Artery Disease ( stents placed/3  V CABG ); Hypertension     Mother: Hypertension; Hyperlipidemia     Brother(s): 1 brother(s) total;  smoker     Sister(s): 4 sister(s) total; 1, was stillborn      Maternal Grandmother: Breast Cancer         Social History:     Occupation: [employer] of [employer]      Marital Status:      Children: 2 children         Tobacco/Alcohol/Supplements:     Last Reviewed on 10/23/2018 01:57 PM by Mo Fry    Tobacco: She has never smoked.          Alcohol: Frequency: Socially         Supplements:  Patient admits to use of multivitamin.              Immunizations:     Havrix -adult dose (HepA) 5/15/2018     Fluzone (3 + years dose) 2017     Afluria Quadrivalent 2018     Adacel (Tdap) 2008         Allergies:     Last Reviewed on 10/23/2018 01:57 PM by Mo Fry      No Known Drug Allergies.         Current Medications:     Last Reviewed on 10/23/2018 01:57 PM by Mo Fry    Lisinopril 10mg Tablet 1 tab daily     co q 10 daily     multivitamin daily         OBJECTIVE:        Vitals:         Current: 10/23/2018 1:59:25 PM    Ht:  5 ft, 1.25 in;  Wt: 141.4 lbs;  BMI: 26.5    T: 97.9 F (oral);  BP: 154/79 mm Hg (left arm, sitting);  P: 69 bpm (left arm (BP Cuff), sitting);  sCr: 0.77 mg/dL;  GFR: 74.68        Repeat:     2:18:40 PM     BP:   152/82mm Hg (right arm, sitting)         Exams:     PHYSICAL EXAM:     GENERAL: vital signs recorded - well developed, well nourished;  no apparent distress;     NECK: carotid exam reveals no bruits;     RESPIRATORY: normal respiratory rate and pattern with no distress; normal breath sounds with no rales, rhonchi, wheezes or rubs;     CARDIOVASCULAR: normal rate; rhythm is regular;  no systolic murmur; no edema;     LYMPHATIC: no axillary adenopathy;     BREAST/INTEGUMENT: BREASTS: breast exam is normal without masses, skin changes, or nipple discharge;     PSYCHIATRIC:  appropriate affect and demeanor; normal speech  pattern; grossly normal memory;         ASSESSMENT           401.1   I10  Hypertension              DDx:     V76.10   Z12.39  Screening for breast cancer, unspecified              DDx:         ORDERS:         Meds Prescribed:       Refill of: Lisinopril 10mg Tablet 1 tab daily  #90 (Ninety) tablet(s) Refills: 1         Radiology/Test Orders:       [id number]  Screening digital breast tomosynthesis bi  (Send-Out)           Lab Orders:       FUTURE  Future order to be done at patients convenience  (Send-Out)         [id number]  [facility] CMP AND LIPID: [id number]  (Send-Out)                   PLAN:          Hypertension had flu vaccine at work and has had hep a vaccine /has not had her BP today, took her last pill yesterday         FOLLOW-UP TESTING #1: FOLLOW-UP LABORATORY:  Labs to be scheduled in the future include HTN/Lipid Panel: CMP, Lipid.      RECOMMENDATIONS given include: perform routine monitoring of blood pressure with home blood pressure cuff, reduction of dietary salt intake, and Advised about free BP checks on the last Saturday of each month.      FOLLOW-UP: fasting for labs           Prescriptions:       Refill of: Lisinopril 10mg Tablet 1 tab daily  #90 (Ninety) tablet(s) Refills: 1           Orders:       FUTURE  Future order to be done at patients convenience  (Send-Out)         70781  [facility] CMP AND LIPID: [id number]  (Send-Out)             Patient Education Handouts:       High Blood Pressure (HTN)           Screening for breast cancer, unspecified last mammogram was 10-25-17         FOLLOW-UP TESTING #1:    RADIOLOGY:  I have ordered Screening 3D Mammogram Bilateral to be done today.            Orders:       [id number]  Screening digital breast tomosynthesis bi  (Send-Out)               Patient Recommendations:        For  Hypertension:             The following laboratory testing has been ordered: Begin monitoring your blood pressure by brief nurse visits at our office, a home blood pressure  monitor, or by checking on the machines in pharmacies or stores.  Keep a log of the readings. Reduce the amount of salt in your diet.              CHARGE CAPTURE           **Please note: ICD descriptions below are intended for billing purposes only and may not represent clinical diagnoses**        Primary Diagnosis:         401.1 Hypertension            I10    Essential (primary) hypertension              Orders:          19103   Office/outpatient visit; established patient, level 4  (In-House)           V76.10 Screening for breast cancer, unspecified            Z12.39    Encounter for other screening for malignant neoplasm of breast

## 2021-05-18 NOTE — PROGRESS NOTES
Anabelle ColemanAbbi 1960     Office/Outpatient Visit    Visit Date: Sat 10:42 am    Provider: Ritika Chance N.P. (Assistant: Ella Burgos MA)    Location: [facility]        Electronically signed by Ritika Chance N.P. on  2018 01:21:20 PM                             SUBJECTIVE:        CC:     Olga Lidia is a 58 year old White female.  Patient complains of urinary frequency and lower back pain.;         HPI:         Patient complains of urinary frequency.  This began within the last 3 days.  Associated symptoms include flank pain, urgency and urinary frequency.  She denies associated abdominal pain, fever, hematuria, nausea or urinary incontinence.  Treatments tried thus far include OTC back medication.      ROS:     CONSTITUTIONAL:  Negative for chills and fever.      CARDIOVASCULAR:  Negative for chest pain and palpitations.      RESPIRATORY:  Negative for dyspnea and frequent wheezing.      GASTROINTESTINAL:  Negative for abdominal pain and vomiting.      GENITOURINARY:  Positive for urgency and frequent urination.      MUSCULOSKELETAL:  Positive for back pain.          [facility]/:     Last Reviewed on 2018 09:06 AM by Catherine Burgos    Past Medical History:                 PAST MEDICAL HISTORY             GYNECOLOGICAL HISTORY:        Contraception: S/P tubal ligation;    Menopause at age 52.          PREVENTIVE HEALTH MAINTENANCE             Hepatitis C Medicare Screening: was last done 10/2008 negative     MAMMOGRAM: Done within last 2 years and results in are chart was last done 10- stable         Surgical History:         cyst removed from breast; Procedures: colonoscopy 12-31-10/hemorrhoids/diverticulosis         Family History:     Father: Coronary Artery Disease ( stents placed/3 V CABG ); Hypertension     Mother: Hypertension; Hyperlipidemia     Brother(s): 1 brother(s) total;  smoker     Sister(s): 4 sister(s) total; 1, was stillborn       Maternal Grandmother: Breast Cancer         Social History:     Occupation: [employer]      Marital Status:      Children: 2 children         Tobacco/Alcohol/Supplements:     Last Reviewed on 7/28/2018 10:45 AM by Ella Burgos    Tobacco: She has never smoked.          Alcohol: Frequency: Socially         Supplements:  Patient admits to use of multivitamin.          Substance Abuse History:     Last Reviewed on 2/19/2018 02:14 PM by Anthony Simmons        Mental Health History:     Last Reviewed on 2/19/2018 02:14 PM by Anthony Simmons        Communicable Diseases (eg STDs):     Last Reviewed on 2/19/2018 02:14 PM by Anthony Simmons            Current Problems:     Last Reviewed on 2/19/2018 02:14 PM by Anthony Simmons    Microscopic hematuria     Urinary frequency     Pre-diabetes     Hypertension     Finger pain     Hemorrhoids, external         Immunizations:     Havrix -adult dose (HepA) 5/15/2018     Fluzone (3 + years dose) 11/9/2017     Adacel (Tdap) 4/22/2008         Allergies:     Last Reviewed on 7/28/2018 10:42 AM by Ella Burgos      No Known Drug Allergies.         Current Medications:     Last Reviewed on 7/28/2018 10:42 AM by Ella Burgos    Lisinopril 10mg Tablet 1 tab daily     co q 10 daily     multivitamin daily         OBJECTIVE:        Vitals:         Current: 7/28/2018 10:44:36 AM    Ht:  5 ft, 1.25 in;  Wt: 138.7 lbs;  BMI: 26.0    T: 98.3 F (oral);  BP: 126/80 mm Hg (left arm, sitting);  P: 79 bpm (left arm (BP Cuff), sitting);  sCr: 0.77 mg/dL;  GFR: 74.07        Exams:     PHYSICAL EXAM:     GENERAL: well developed, well nourished;  no apparent distress;     RESPIRATORY: normal respiratory rate and pattern with no distress; normal breath sounds with no rales, rhonchi, wheezes or rubs;     CARDIOVASCULAR: normal rate; rhythm is regular;     GASTROINTESTINAL: nontender; normal bowel sounds; no organomegaly;     NEUROLOGIC:  mental status: alert and oriented x 3; GROSSLY INTACT     PSYCHIATRIC: appropriate affect and demeanor;         Lab/Test Results:             Glucose, Urine:  Neg (07/28/2018),     Bilirubin, urine:  Negative (07/28/2018),     Ketones, Urine Strip:  Negative (07/28/2018),     Specific Gravity, urine:  1.020 (07/28/2018),     Blood in Urine:  trace (07/28/2018),     pH, urine:  6.0 (07/28/2018),     Protein Urine QL:  negative (07/28/2018),     Urobilinogen, urine:  0.2 E.U./dL (07/28/2018),     Nitrite, Urine:  Negative (07/28/2018),     Leukoctyes, urine:  Trace (07/28/2018),     Appearance:  Clear (07/28/2018),     collection source:  Clean-catch (07/28/2018),     Color:  Yellow (07/28/2018),     Performed by::  mlb (07/28/2018),             ASSESSMENT           788.41   R35.0  Urinary frequency              DDx:         ORDERS:         Meds Prescribed:       Macrobid (Nitrofurantoin) 100mg Capsules one BID x 7 days  #14 (Fourteen) capsule(s) Refills: 0         Lab Orders:       87689  Urinalysis, automated, without microscopy  (In-House)         15140  [facility] - [facility] Urine Culture  (Send-Out)                   PLAN:          Urinary frequency Will treat empirically for UTI. Await urine culture. Also discussed cystitis and diet. Handout provided to patient.           Prescriptions:       Macrobid (Nitrofurantoin) 100mg Capsules one BID x 7 days  #14 (Fourteen) capsule(s) Refills: 0           Orders:       [id number]  Urinalysis, automated, without microscopy  (In-House)         61916  [facility] - [facility] Urine Culture  (Send-Out)               CHARGE CAPTURE           **Please note: ICD descriptions below are intended for billing purposes only and may not represent clinical diagnoses**        Primary Diagnosis:         788.41 Urinary frequency            R35.0    Frequency of micturition              Orders:          11106   Office/outpatient visit; established patient, level 3  (In-House)             11601   Urinalysis, automated,  without microscopy  (In-House)

## 2021-07-01 VITALS
SYSTOLIC BLOOD PRESSURE: 152 MMHG | HEIGHT: 61 IN | HEART RATE: 70 BPM | TEMPERATURE: 98.5 F | WEIGHT: 139.8 LBS | DIASTOLIC BLOOD PRESSURE: 88 MMHG | BODY MASS INDEX: 26.39 KG/M2

## 2021-07-01 VITALS
SYSTOLIC BLOOD PRESSURE: 129 MMHG | HEART RATE: 70 BPM | BODY MASS INDEX: 25.79 KG/M2 | TEMPERATURE: 98 F | WEIGHT: 136.6 LBS | HEIGHT: 61 IN | DIASTOLIC BLOOD PRESSURE: 73 MMHG

## 2021-07-01 VITALS
TEMPERATURE: 97.7 F | HEIGHT: 61 IN | DIASTOLIC BLOOD PRESSURE: 87 MMHG | BODY MASS INDEX: 25.9 KG/M2 | WEIGHT: 137.2 LBS | SYSTOLIC BLOOD PRESSURE: 139 MMHG | HEART RATE: 74 BPM

## 2021-07-01 VITALS
HEART RATE: 67 BPM | BODY MASS INDEX: 25.45 KG/M2 | DIASTOLIC BLOOD PRESSURE: 82 MMHG | HEIGHT: 61 IN | TEMPERATURE: 98.6 F | SYSTOLIC BLOOD PRESSURE: 133 MMHG | WEIGHT: 134.8 LBS

## 2021-07-01 VITALS
TEMPERATURE: 98.4 F | HEIGHT: 61 IN | SYSTOLIC BLOOD PRESSURE: 158 MMHG | HEART RATE: 70 BPM | WEIGHT: 136 LBS | DIASTOLIC BLOOD PRESSURE: 78 MMHG | BODY MASS INDEX: 25.68 KG/M2

## 2021-07-01 VITALS
HEART RATE: 64 BPM | DIASTOLIC BLOOD PRESSURE: 84 MMHG | BODY MASS INDEX: 26.28 KG/M2 | WEIGHT: 139.2 LBS | TEMPERATURE: 97.8 F | HEIGHT: 61 IN | SYSTOLIC BLOOD PRESSURE: 134 MMHG

## 2021-07-01 VITALS
TEMPERATURE: 98.2 F | DIASTOLIC BLOOD PRESSURE: 79 MMHG | SYSTOLIC BLOOD PRESSURE: 124 MMHG | HEART RATE: 67 BPM | BODY MASS INDEX: 25.98 KG/M2 | WEIGHT: 137.6 LBS | HEIGHT: 61 IN

## 2021-07-01 VITALS
HEART RATE: 69 BPM | TEMPERATURE: 97.9 F | DIASTOLIC BLOOD PRESSURE: 82 MMHG | BODY MASS INDEX: 26.7 KG/M2 | SYSTOLIC BLOOD PRESSURE: 152 MMHG | HEIGHT: 61 IN | WEIGHT: 141.4 LBS

## 2021-07-01 VITALS
HEART RATE: 79 BPM | TEMPERATURE: 98.3 F | BODY MASS INDEX: 26.19 KG/M2 | HEIGHT: 61 IN | WEIGHT: 138.7 LBS | DIASTOLIC BLOOD PRESSURE: 80 MMHG | SYSTOLIC BLOOD PRESSURE: 126 MMHG

## 2021-07-01 VITALS
HEART RATE: 69 BPM | TEMPERATURE: 98.1 F | BODY MASS INDEX: 25.71 KG/M2 | WEIGHT: 136.2 LBS | SYSTOLIC BLOOD PRESSURE: 118 MMHG | HEIGHT: 61 IN | DIASTOLIC BLOOD PRESSURE: 72 MMHG

## 2021-07-01 VITALS
BODY MASS INDEX: 26.32 KG/M2 | WEIGHT: 139.4 LBS | HEART RATE: 73 BPM | HEIGHT: 61 IN | DIASTOLIC BLOOD PRESSURE: 82 MMHG | TEMPERATURE: 97.9 F | SYSTOLIC BLOOD PRESSURE: 141 MMHG

## 2021-07-02 VITALS
TEMPERATURE: 98 F | SYSTOLIC BLOOD PRESSURE: 151 MMHG | WEIGHT: 143 LBS | HEART RATE: 74 BPM | BODY MASS INDEX: 27 KG/M2 | HEIGHT: 61 IN | DIASTOLIC BLOOD PRESSURE: 87 MMHG

## 2021-07-02 VITALS
SYSTOLIC BLOOD PRESSURE: 138 MMHG | WEIGHT: 141 LBS | DIASTOLIC BLOOD PRESSURE: 98 MMHG | BODY MASS INDEX: 26.62 KG/M2 | HEIGHT: 61 IN | TEMPERATURE: 97.2 F | HEART RATE: 77 BPM

## 2021-10-20 ENCOUNTER — OFFICE VISIT (OUTPATIENT)
Dept: FAMILY MEDICINE CLINIC | Age: 61
End: 2021-10-20

## 2021-10-20 ENCOUNTER — LAB (OUTPATIENT)
Dept: LAB | Facility: HOSPITAL | Age: 61
End: 2021-10-20

## 2021-10-20 VITALS
HEART RATE: 67 BPM | DIASTOLIC BLOOD PRESSURE: 73 MMHG | WEIGHT: 143 LBS | BODY MASS INDEX: 26.8 KG/M2 | SYSTOLIC BLOOD PRESSURE: 127 MMHG

## 2021-10-20 DIAGNOSIS — R73.03 PRE-DIABETES: ICD-10-CM

## 2021-10-20 DIAGNOSIS — I10 HYPERTENSION, UNSPECIFIED TYPE: Primary | ICD-10-CM

## 2021-10-20 DIAGNOSIS — I10 HYPERTENSION, UNSPECIFIED TYPE: ICD-10-CM

## 2021-10-20 LAB
ALBUMIN SERPL-MCNC: 4.6 G/DL (ref 3.5–5.2)
ALBUMIN/GLOB SERPL: 1.6 G/DL
ALP SERPL-CCNC: 87 U/L (ref 39–117)
ALT SERPL W P-5'-P-CCNC: 25 U/L (ref 1–33)
ANION GAP SERPL CALCULATED.3IONS-SCNC: 7.5 MMOL/L (ref 5–15)
AST SERPL-CCNC: 22 U/L (ref 1–32)
BILIRUB SERPL-MCNC: 0.7 MG/DL (ref 0–1.2)
BUN SERPL-MCNC: 18 MG/DL (ref 8–23)
BUN/CREAT SERPL: 19.6 (ref 7–25)
CALCIUM SPEC-SCNC: 9.4 MG/DL (ref 8.6–10.5)
CHLORIDE SERPL-SCNC: 101 MMOL/L (ref 98–107)
CHOLEST SERPL-MCNC: 229 MG/DL (ref 0–200)
CO2 SERPL-SCNC: 27.5 MMOL/L (ref 22–29)
CREAT SERPL-MCNC: 0.92 MG/DL (ref 0.57–1)
GFR SERPL CREATININE-BSD FRML MDRD: 62 ML/MIN/1.73
GLOBULIN UR ELPH-MCNC: 2.9 GM/DL
GLUCOSE SERPL-MCNC: 85 MG/DL (ref 65–99)
HBA1C MFR BLD: 6.07 % (ref 4.8–5.6)
HDLC SERPL-MCNC: 31 MG/DL (ref 40–60)
LDLC SERPL CALC-MCNC: 128 MG/DL (ref 0–100)
LDLC/HDLC SERPL: 3.86 {RATIO}
POTASSIUM SERPL-SCNC: 4.4 MMOL/L (ref 3.5–5.2)
PROT SERPL-MCNC: 7.5 G/DL (ref 6–8.5)
SODIUM SERPL-SCNC: 136 MMOL/L (ref 136–145)
TRIGL SERPL-MCNC: 392 MG/DL (ref 0–150)
VLDLC SERPL-MCNC: 70 MG/DL (ref 5–40)

## 2021-10-20 PROCEDURE — 36415 COLL VENOUS BLD VENIPUNCTURE: CPT

## 2021-10-20 PROCEDURE — 99213 OFFICE O/P EST LOW 20 MIN: CPT | Performed by: NURSE PRACTITIONER

## 2021-10-20 PROCEDURE — 80053 COMPREHEN METABOLIC PANEL: CPT

## 2021-10-20 PROCEDURE — 80061 LIPID PANEL: CPT

## 2021-10-20 PROCEDURE — 83036 HEMOGLOBIN GLYCOSYLATED A1C: CPT

## 2021-10-20 RX ORDER — LISINOPRIL 10 MG/1
10 TABLET ORAL DAILY
COMMUNITY
Start: 2021-07-24 | End: 2021-10-20 | Stop reason: SDUPTHER

## 2021-10-20 RX ORDER — LISINOPRIL 10 MG/1
10 TABLET ORAL DAILY
Qty: 90 TABLET | Refills: 1 | Status: SHIPPED | OUTPATIENT
Start: 2021-10-20 | End: 2022-04-20 | Stop reason: SDUPTHER

## 2021-10-20 NOTE — PROGRESS NOTES
Anabelle Coleman presents to [facility].    Chief Complaint:  Hypertension         History of Present Illness:  Hypertension:  Current medication Lisinopril   Tolerating Medication: Yes  Checking BP at home and it is : not checking   Needs refills: Yes/ keira   Labs   Lab Results       Component                Value               Date                       GLUCOSE                  101 (H)             2018                 BUN                      16                  2021                 CREATININE               0.82                2021                 EGFRIFNONA               86                  2018                 EGFRIFAFRI               99                  2018                 BCR                      20                  2021                 K                        4.2                 2021                 CO2                      25                  2021                 CALCIUM                  9.4                 2021                 ALBUMIN                  4.7                 10/19/2020                 LABIL2                   1.5                 10/19/2020                 AST                      17                  10/19/2020                 ALT                      21                  10/19/2020              PMH changes since : none   Had flu vaccine at school last week     Works at [employer]      2 children     Family History:     Father: Coronary Artery Disease ( stents placed/3 V CABG ); Hypertension     Mother: Hypertension; Hyperlipidemia     Brother(s): 1 brother(s) total;  smoker     Sister(s): 4 sister(s) total; 1, was stillborn      Maternal Grandmother: Breast Cancer       Review of Systems:  Review of Systems   Constitutional: Negative for fatigue and fever.   Respiratory: Negative for cough and shortness of breath.    Cardiovascular: Negative for chest  pain, palpitations and leg swelling.   Neurological: Negative for numbness.          Vital Signs:   /73 (BP Location: Left arm, Patient Position: Sitting)   Pulse 67   Wt 64.9 kg (143 lb)   BMI 26.80 kg/m²       Physical Exam:  Physical Exam  Vitals reviewed.   Constitutional:       General: She is not in acute distress.     Appearance: Normal appearance.   Neck:      Vascular: No carotid bruit.   Cardiovascular:      Rate and Rhythm: Normal rate and regular rhythm.      Heart sounds: Normal heart sounds. No murmur heard.      Pulmonary:      Effort: Pulmonary effort is normal. No respiratory distress.      Breath sounds: Normal breath sounds.   Musculoskeletal:      Right lower leg: No edema.      Left lower leg: No edema.   Neurological:      Mental Status: She is alert.   Psychiatric:         Mood and Affect: Mood normal.         Behavior: Behavior normal.         Result Review      The following data was reviewed by: GARCIA Couch on 10/20/2021:    Results for orders placed or performed during the hospital encounter of 04/26/21   Hemoglobin A1C With EAG   Result Value Ref Range    Mean Bld Glu Estim. 128 mg/dL    Hemoglobin A1C 6.1 (H) 3.5 - 5.7 %   Basic Metabolic Panel   Result Value Ref Range    Glucose 97 65 - 99 mg/dL    BUN 16 5 - 25 mg/dL    Creatinine 0.82 0.50 - 0.90 mg/dL    BUN/Creatinine Ratio 20 6 - 20 [ratio]    GFR >60 >60 mL/min/[1.73_m2]    OSMOLALITY CALC 283 273 - 304    Calcium 9.4 8.7 - 10.4 mg/dL    Sodium 136 135 - 147 mmol/L    Potassium 4.2 3.5 - 5.3 mmol/L    Chloride 100 99 - 111 mmol/L    CO2 25 22 - 32 mmol/L    Anion Gap 15 8 - 19 mmol/L               Assessment and Plan:          Diagnoses and all orders for this visit:    1. Hypertension, unspecified type (Primary)  Assessment & Plan:  Ate 6 hours ago; will go ahead and do labs, reviewed last labs; Hypertension is stable.  monitor BP at home. Continue current meds. Continue to modify diet and lifestyle. Will  need labs every 6 months and follow up.         Orders:  -     Comprehensive Metabolic Panel; Future  -     Lipid Panel; Future  -     lisinopril (PRINIVIL,ZESTRIL) 10 MG tablet; Take 1 tablet by mouth Daily.  Dispense: 90 tablet; Refill: 1    2. Pre-diabetes  Assessment & Plan:  Reviewed last labs, A1C was up     Orders:  -     Hemoglobin A1c; Future        Follow Up   Return in about 6 months (around 4/20/2022) for follow up pending x-ray result.  Patient was given instructions and counseling regarding her condition or for health maintenance advice. Please see specific information pulled into the AVS if appropriate.

## 2022-04-20 ENCOUNTER — LAB (OUTPATIENT)
Dept: LAB | Facility: HOSPITAL | Age: 62
End: 2022-04-20

## 2022-04-20 ENCOUNTER — OFFICE VISIT (OUTPATIENT)
Dept: FAMILY MEDICINE CLINIC | Age: 62
End: 2022-04-20

## 2022-04-20 VITALS
HEART RATE: 75 BPM | DIASTOLIC BLOOD PRESSURE: 72 MMHG | WEIGHT: 139 LBS | BODY MASS INDEX: 26.05 KG/M2 | SYSTOLIC BLOOD PRESSURE: 128 MMHG

## 2022-04-20 DIAGNOSIS — R73.03 PRE-DIABETES: ICD-10-CM

## 2022-04-20 DIAGNOSIS — I10 ESSENTIAL HYPERTENSION: Primary | ICD-10-CM

## 2022-04-20 DIAGNOSIS — E78.2 MIXED HYPERLIPIDEMIA: ICD-10-CM

## 2022-04-20 LAB
ALBUMIN SERPL-MCNC: 4.5 G/DL (ref 3.5–5.2)
ALBUMIN/GLOB SERPL: 1.4 G/DL
ALP SERPL-CCNC: 88 U/L (ref 39–117)
ALT SERPL W P-5'-P-CCNC: 25 U/L (ref 1–33)
ANION GAP SERPL CALCULATED.3IONS-SCNC: 11.4 MMOL/L (ref 5–15)
AST SERPL-CCNC: 24 U/L (ref 1–32)
BILIRUB SERPL-MCNC: 0.6 MG/DL (ref 0–1.2)
BUN SERPL-MCNC: 21 MG/DL (ref 8–23)
BUN/CREAT SERPL: 24.4 (ref 7–25)
CALCIUM SPEC-SCNC: 9.5 MG/DL (ref 8.6–10.5)
CHLORIDE SERPL-SCNC: 102 MMOL/L (ref 98–107)
CHOLEST SERPL-MCNC: 163 MG/DL (ref 0–200)
CO2 SERPL-SCNC: 26.6 MMOL/L (ref 22–29)
CREAT SERPL-MCNC: 0.86 MG/DL (ref 0.57–1)
EGFRCR SERPLBLD CKD-EPI 2021: 77 ML/MIN/1.73
GLOBULIN UR ELPH-MCNC: 3.2 GM/DL
GLUCOSE SERPL-MCNC: 83 MG/DL (ref 65–99)
HBA1C MFR BLD: 6.5 % (ref 4.8–5.6)
HDLC SERPL-MCNC: 38 MG/DL (ref 40–60)
LDLC SERPL CALC-MCNC: 91 MG/DL (ref 0–100)
LDLC/HDLC SERPL: 2.24 {RATIO}
POTASSIUM SERPL-SCNC: 4.5 MMOL/L (ref 3.5–5.2)
PROT SERPL-MCNC: 7.7 G/DL (ref 6–8.5)
SODIUM SERPL-SCNC: 140 MMOL/L (ref 136–145)
TRIGL SERPL-MCNC: 200 MG/DL (ref 0–150)
VLDLC SERPL-MCNC: 34 MG/DL (ref 5–40)

## 2022-04-20 PROCEDURE — 80053 COMPREHEN METABOLIC PANEL: CPT | Performed by: NURSE PRACTITIONER

## 2022-04-20 PROCEDURE — 80061 LIPID PANEL: CPT | Performed by: NURSE PRACTITIONER

## 2022-04-20 PROCEDURE — 83036 HEMOGLOBIN GLYCOSYLATED A1C: CPT

## 2022-04-20 PROCEDURE — 99213 OFFICE O/P EST LOW 20 MIN: CPT | Performed by: NURSE PRACTITIONER

## 2022-04-20 PROCEDURE — 36415 COLL VENOUS BLD VENIPUNCTURE: CPT | Performed by: NURSE PRACTITIONER

## 2022-04-20 RX ORDER — LISINOPRIL 10 MG/1
10 TABLET ORAL DAILY
Qty: 90 TABLET | Refills: 1 | Status: SHIPPED | OUTPATIENT
Start: 2022-04-20 | End: 2022-10-17 | Stop reason: SDUPTHER

## 2022-04-20 NOTE — PROGRESS NOTES
Anabelle Coleman presents to [facility].    Chief Complaint:  Hypertension         History of Present Illness:  Hypertension:  Current medication: lisinopril   Tolerating Medication: Yes   Checking BP at home and it is: not checking   Needs refills: Yes / keira   Labs:  Lab Results       Component                Value               Date                       GLUCOSE                  85                  10/20/2021                 BUN                      18                  10/20/2021                 CREATININE               0.92                10/20/2021                 EGFRIFNONA               62                  10/20/2021                 EGFRIFAFRI               99                  2018                 BCR                      19.6                10/20/2021                 K                        4.4                 10/20/2021                 CO2                      27.5                10/20/2021                 CALCIUM                  9.4                 10/20/2021                 ALBUMIN                  4.60                10/20/2021                 LABIL2                   1.5                 10/19/2020                 AST                      22                  10/20/2021                 ALT                      25                  10/20/2021               Past Medical History: changes since : none                 PAST MEDICAL HISTORY             GYNECOLOGICAL HISTORY:        Contraception: S/P tubal ligation;    Menopause at age 52.    Last Pap was 19         PREVENTIVE HEALTH MAINTENANCE             Hepatitis C Medicare Screening: was last done 10/2008 negative     MAMMOGRAM: Done within last 2 years and results in are chart was last done 19         Surgical History:         cyst removed from breast; Procedures: colonoscopy 12-31-10/hemorrhoids/diverticulosis         Family History:     Father: Coronary Artery Disease ( stents placed/3 V CABG );  Hypertension     Mother: Hypertension; Hyperlipidemia     Brother(s): 1 brother(s) total;  smoker     Sister(s): 4 sister(s) total; 1, was stillborn      Maternal Grandmother: Breast Cancer         Social History:     Occupation: [employer]      Marital Status:      Children: 2 children             Review of Systems:  Review of Systems   Constitutional: Negative for fatigue and fever.   Respiratory: Positive for cough (dry cough in the winter only ). Negative for shortness of breath.    Cardiovascular: Negative for chest pain, palpitations and leg swelling.   Neurological: Negative for numbness.          Current Outpatient Medications:   •  lisinopril (PRINIVIL,ZESTRIL) 10 MG tablet, Take 1 tablet by mouth Daily., Disp: 90 tablet, Rfl: 1    Vital Signs:   Vitals:    22 1358   BP: 128/72   BP Location: Left arm   Patient Position: Sitting   Pulse: 75   Weight: 63 kg (139 lb)         Physical Exam:  Physical Exam  Vitals reviewed.   Constitutional:       General: She is not in acute distress.     Appearance: Normal appearance.   Neck:      Vascular: No carotid bruit.   Cardiovascular:      Rate and Rhythm: Normal rate and regular rhythm.      Heart sounds: Normal heart sounds. No murmur heard.  Pulmonary:      Effort: Pulmonary effort is normal. No respiratory distress.      Breath sounds: Normal breath sounds.   Musculoskeletal:      Right lower leg: No edema.      Left lower leg: No edema.   Neurological:      Mental Status: She is alert.   Psychiatric:         Mood and Affect: Mood normal.         Behavior: Behavior normal.         Result Review      The following data was reviewed by: GARCIA Couch on 2022:    Results for orders placed or performed in visit on 10/20/21   Comprehensive Metabolic Panel    Specimen: Blood   Result Value Ref Range    Glucose 85 65 - 99 mg/dL    BUN 18 8 - 23 mg/dL    Creatinine 0.92 0.57 - 1.00 mg/dL    Sodium  136 136 - 145 mmol/L    Potassium 4.4 3.5 - 5.2 mmol/L    Chloride 101 98 - 107 mmol/L    CO2 27.5 22.0 - 29.0 mmol/L    Calcium 9.4 8.6 - 10.5 mg/dL    Total Protein 7.5 6.0 - 8.5 g/dL    Albumin 4.60 3.50 - 5.20 g/dL    ALT (SGPT) 25 1 - 33 U/L    AST (SGOT) 22 1 - 32 U/L    Alkaline Phosphatase 87 39 - 117 U/L    Total Bilirubin 0.7 0.0 - 1.2 mg/dL    eGFR Non African Amer 62 >60 mL/min/1.73    Globulin 2.9 gm/dL    A/G Ratio 1.6 g/dL    BUN/Creatinine Ratio 19.6 7.0 - 25.0    Anion Gap 7.5 5.0 - 15.0 mmol/L   Hemoglobin A1c    Specimen: Blood   Result Value Ref Range    Hemoglobin A1C 6.07 (H) 4.80 - 5.60 %   Lipid Panel    Specimen: Blood   Result Value Ref Range    Total Cholesterol 229 (H) 0 - 200 mg/dL    Triglycerides 392 (H) 0 - 150 mg/dL    HDL Cholesterol 31 (L) 40 - 60 mg/dL    LDL Cholesterol  128 (H) 0 - 100 mg/dL    VLDL Cholesterol 70 (H) 5 - 40 mg/dL    LDL/HDL Ratio 3.86                Assessment and Plan:          Diagnoses and all orders for this visit:    1. Essential hypertension (Primary)  Assessment & Plan:  Hypertension is stable.to monitor BP at home. Continue current meds. Continue to modify diet and lifestyle. Will need labs every 6 months and follow up.   Has eaten today, had breakfast and lunch today, but would like to go ahead and get labs, reviewed last few labs.     Orders:  -     Comprehensive Metabolic Panel  -     Lipid Panel  -     lisinopril (PRINIVIL,ZESTRIL) 10 MG tablet; Take 1 tablet by mouth Daily.  Dispense: 90 tablet; Refill: 1    2. Mixed hyperlipidemia  Assessment & Plan:  Reviewed her previous labs, to work on her diet. Checking labs today     Orders:  -     Comprehensive Metabolic Panel  -     Lipid Panel    3. Pre-diabetes  Assessment & Plan:  Reviewed A1C's over the last few years.     Orders:  -     Hemoglobin A1c; Future        Follow Up   Return for followup pending lab results.  Patient was given instructions and counseling regarding her condition or for health  maintenance advice. Please see specific information pulled into the AVS if appropriate.

## 2022-04-20 NOTE — ASSESSMENT & PLAN NOTE
Hypertension is stable.to monitor BP at home. Continue current meds. Continue to modify diet and lifestyle. Will need labs every 6 months and follow up.   Has eaten today, had breakfast and lunch today, but would like to go ahead and get labs, reviewed last few labs.

## 2022-10-17 ENCOUNTER — OFFICE VISIT (OUTPATIENT)
Dept: FAMILY MEDICINE CLINIC | Age: 62
End: 2022-10-17

## 2022-10-17 ENCOUNTER — LAB (OUTPATIENT)
Dept: LAB | Facility: HOSPITAL | Age: 62
End: 2022-10-17

## 2022-10-17 VITALS
WEIGHT: 142.6 LBS | DIASTOLIC BLOOD PRESSURE: 79 MMHG | SYSTOLIC BLOOD PRESSURE: 124 MMHG | HEART RATE: 66 BPM | RESPIRATION RATE: 17 BRPM | BODY MASS INDEX: 26.92 KG/M2 | OXYGEN SATURATION: 98 % | HEIGHT: 61 IN

## 2022-10-17 DIAGNOSIS — I10 ESSENTIAL HYPERTENSION: ICD-10-CM

## 2022-10-17 DIAGNOSIS — Z23 NEED FOR INFLUENZA VACCINATION: Primary | ICD-10-CM

## 2022-10-17 LAB
ANION GAP SERPL CALCULATED.3IONS-SCNC: 11.1 MMOL/L (ref 5–15)
BUN SERPL-MCNC: 18 MG/DL (ref 8–23)
BUN/CREAT SERPL: 22.5 (ref 7–25)
CALCIUM SPEC-SCNC: 9.8 MG/DL (ref 8.6–10.5)
CHLORIDE SERPL-SCNC: 102 MMOL/L (ref 98–107)
CO2 SERPL-SCNC: 25.9 MMOL/L (ref 22–29)
CREAT SERPL-MCNC: 0.8 MG/DL (ref 0.57–1)
EGFRCR SERPLBLD CKD-EPI 2021: 83.4 ML/MIN/1.73
GLUCOSE SERPL-MCNC: 82 MG/DL (ref 65–99)
POTASSIUM SERPL-SCNC: 4.2 MMOL/L (ref 3.5–5.2)
SODIUM SERPL-SCNC: 139 MMOL/L (ref 136–145)

## 2022-10-17 PROCEDURE — 90471 IMMUNIZATION ADMIN: CPT | Performed by: NURSE PRACTITIONER

## 2022-10-17 PROCEDURE — 90686 IIV4 VACC NO PRSV 0.5 ML IM: CPT | Performed by: NURSE PRACTITIONER

## 2022-10-17 PROCEDURE — 99213 OFFICE O/P EST LOW 20 MIN: CPT | Performed by: NURSE PRACTITIONER

## 2022-10-17 PROCEDURE — 36415 COLL VENOUS BLD VENIPUNCTURE: CPT | Performed by: NURSE PRACTITIONER

## 2022-10-17 PROCEDURE — 80048 BASIC METABOLIC PNL TOTAL CA: CPT | Performed by: NURSE PRACTITIONER

## 2022-10-17 RX ORDER — LISINOPRIL 10 MG/1
10 TABLET ORAL DAILY
Qty: 90 TABLET | Refills: 1 | Status: SHIPPED | OUTPATIENT
Start: 2022-10-17

## 2022-10-17 NOTE — PROGRESS NOTES
Anabelle Coleman presents to [facility].    Chief Complaint:  Hypertension and Follow-up         History of Present Illness:  Hypertension:  Current medication: lisinopril   Tolerating Medication: Yes  Checking BP at home and it is: not checking   Needs refills: Yes to keira   Labs:  Lab Results       Component                Value               Date                       GLUCOSE                  83                  2022                 BUN                      21                  2022                 CREATININE               0.86                2022                 EGFRIFNONA               62                  10/20/2021                 EGFRIFAFRI               99                  2018                 BCR                      24.4                2022                 K                        4.5                 2022                 CO2                      26.6                2022                 CALCIUM                  9.5                 2022                 ALBUMIN                  4.50                2022                 LABIL2                   1.5                 10/19/2020                 AST                      24                  2022                 ALT                      25                  2022              Past Medical History: changes since :             GYNECOLOGICAL HISTORY:        Contraception: S/P tubal ligation;    Menopause at age 52.    Last Pap was 19         PREVENTIVE HEALTH MAINTENANCE             Hepatitis C Medicare Screening: was last done 10/2008 negative           Surgical History:         cyst removed from breast;   Procedures: colonoscopy 12-31-10/hemorrhoids/diverticulosis         Family History:     Father: Coronary Artery Disease ( stents placed/3 V CABG ); Hypertension     Mother: Hypertension; Hyperlipidemia     Brother(s): 1 brother(s) total;  smoker     Sister(s):  "4 sister(s) total; 1, was stillborn      Maternal Grandmother: Breast Cancer         Social History:     Occupation: [employer]      Marital Status:      Children: 2 children           Review of Systems:  Review of Systems   Constitutional: Negative for fatigue and fever.   Respiratory: Negative for cough and shortness of breath.    Cardiovascular: Negative for chest pain, palpitations and leg swelling.   Neurological: Negative for numbness.          Current Outpatient Medications:   •  lisinopril (PRINIVIL,ZESTRIL) 10 MG tablet, Take 1 tablet by mouth Daily., Disp: 90 tablet, Rfl: 1    Vital Signs:   Vitals:    10/17/22 1412   BP: 124/79   BP Location: Right arm   Patient Position: Sitting   Cuff Size: Adult   Pulse: 66   Resp: 17   SpO2: 98%  Comment: room air   Weight: 64.7 kg (142 lb 9.6 oz)   Height: 155.6 cm (61.25\")   PainSc: 0-No pain         Physical Exam:  Physical Exam  Vitals reviewed.   Constitutional:       General: She is not in acute distress.     Appearance: Normal appearance.   Neck:      Vascular: No carotid bruit.   Cardiovascular:      Rate and Rhythm: Normal rate and regular rhythm.      Heart sounds: Normal heart sounds. No murmur heard.  Pulmonary:      Effort: Pulmonary effort is normal. No respiratory distress.      Breath sounds: Normal breath sounds.   Musculoskeletal:      Right lower leg: No edema.      Left lower leg: No edema.   Neurological:      Mental Status: She is alert.   Psychiatric:         Mood and Affect: Mood normal.         Behavior: Behavior normal.         Result Review      The following data was reviewed by: GARCIA Couch on 10/17/2022:    Results for orders placed or performed in visit on 22   Hemoglobin A1c    Specimen: Blood   Result Value Ref Range    Hemoglobin A1C 6.50 (H) 4.80 - 5.60 %               Assessment and Plan:          Diagnoses and all orders for this visit:    1. Need for influenza " vaccination (Primary)  Assessment & Plan:  Updated with flu vaccine today.     Orders:  -     FluLaval/Fluarix/Fluzone >6 Months    2. Essential hypertension  Assessment & Plan:  Reviewed last labs, advised to work on diet and regular exercise, not fasting today, will check a BMP and retunr for her next appt fasting  Hypertension is stable.  to monitor BP at home. Continue current med. Continue to modify diet and lifestyle.     Orders:  -     lisinopril (PRINIVIL,ZESTRIL) 10 MG tablet; Take 1 tablet by mouth Daily.  Dispense: 90 tablet; Refill: 1  -     Basic metabolic panel        Follow Up   Return in about 6 months (around 4/17/2023).  Patient was given instructions and counseling regarding her condition or for health maintenance advice. Please see specific information pulled into the AVS if appropriate.

## 2022-10-17 NOTE — ASSESSMENT & PLAN NOTE
Reviewed last labs, advised to work on diet and regular exercise, not fasting today, will check a BMP and retunr for her next appt fasting  Hypertension is stable.  to monitor BP at home. Continue current med. Continue to modify diet and lifestyle.

## 2023-04-17 ENCOUNTER — OFFICE VISIT (OUTPATIENT)
Dept: FAMILY MEDICINE CLINIC | Age: 63
End: 2023-04-17
Payer: COMMERCIAL

## 2023-04-17 ENCOUNTER — LAB (OUTPATIENT)
Dept: LAB | Facility: HOSPITAL | Age: 63
End: 2023-04-17
Payer: COMMERCIAL

## 2023-04-17 ENCOUNTER — TELEPHONE (OUTPATIENT)
Dept: FAMILY MEDICINE CLINIC | Age: 63
End: 2023-04-17

## 2023-04-17 VITALS
BODY MASS INDEX: 26.01 KG/M2 | WEIGHT: 137.8 LBS | RESPIRATION RATE: 16 BRPM | HEIGHT: 61 IN | DIASTOLIC BLOOD PRESSURE: 74 MMHG | HEART RATE: 66 BPM | SYSTOLIC BLOOD PRESSURE: 131 MMHG

## 2023-04-17 DIAGNOSIS — E78.2 MIXED HYPERLIPIDEMIA: ICD-10-CM

## 2023-04-17 DIAGNOSIS — R73.03 PRE-DIABETES: ICD-10-CM

## 2023-04-17 DIAGNOSIS — I10 ESSENTIAL HYPERTENSION: Primary | ICD-10-CM

## 2023-04-17 LAB — HBA1C MFR BLD: 6.3 % (ref 4.8–5.6)

## 2023-04-17 PROCEDURE — 36415 COLL VENOUS BLD VENIPUNCTURE: CPT | Performed by: NURSE PRACTITIONER

## 2023-04-17 PROCEDURE — 80053 COMPREHEN METABOLIC PANEL: CPT | Performed by: NURSE PRACTITIONER

## 2023-04-17 PROCEDURE — 83036 HEMOGLOBIN GLYCOSYLATED A1C: CPT

## 2023-04-17 PROCEDURE — 99213 OFFICE O/P EST LOW 20 MIN: CPT | Performed by: NURSE PRACTITIONER

## 2023-04-17 PROCEDURE — 80061 LIPID PANEL: CPT | Performed by: NURSE PRACTITIONER

## 2023-04-17 RX ORDER — LISINOPRIL 10 MG/1
10 TABLET ORAL DAILY
Qty: 90 TABLET | Refills: 1 | Status: SHIPPED | OUTPATIENT
Start: 2023-04-17

## 2023-04-17 NOTE — ASSESSMENT & PLAN NOTE
Hypertension is stable.  to monitor BP at home. Continue current meds. Continue to modify diet and lifestyle.   Ate a muffin for breakfast and a school lunch earlier today, wants to go ahead and get her labs, will send on to lab today

## 2023-04-17 NOTE — PROGRESS NOTES
Anabelle Coleman presents to [facility].    Chief Complaint:  Hypertension and Follow-up         History of Present Illness:  Hypertension:  Current medication: lisinopril   Tolerating Medication: Yes  Checking BP at home and it is: not checking   Needs refills: Yes to [facility]   Labs:  Lab Results       Component                Value               Date                       GLUCOSE                  82                  10/17/2022                 BUN                      18                  10/17/2022                 CREATININE               0.80                10/17/2022                 EGFRIFNONA               62                  10/20/2021                 EGFRIFAFRI               99                  2018                 BCR                      22.5                10/17/2022                 K                        4.2                 10/17/2022                 CO2                      25.9                10/17/2022                 CALCIUM                  9.8                 10/17/2022                 ALBUMIN                  4.50                2022                 LABIL2                   1.5                 10/19/2020                 AST                      24                  2022                 ALT                      25                  2022              Past Medical History: changes since :             GYNECOLOGICAL HISTORY:        Contraception: S/P tubal ligation;    Menopause at age 52.    Last Pap was 19         PREVENTIVE HEALTH MAINTENANCE             Hepatitis C Medicare Screening: was last done 10/2008 negative           Surgical History:         cyst removed from breast;   Procedures: colonoscopy 12-31-10/hemorrhoids/diverticulosis         Family History:     Father: Coronary Artery Disease ( stents placed/3 V CABG ); Hypertension     Mother:  late 70's, after a fall; Hypertension; Hyperlipidemia     Brother(s): 1  "brother(s) total;  smoker     Sister(s): 4 sister(s) total; 1, was stillborn      Maternal Grandmother: Breast Cancer         Social History:     Occupation: [employer]/     Marital Status:      Children: 2 children           Review of Systems:  Review of Systems   Constitutional: Negative for fatigue and fever.   Respiratory: Negative for cough and shortness of breath.    Cardiovascular: Negative for chest pain, palpitations and leg swelling.          Current Outpatient Medications:   •  lisinopril (PRINIVIL,ZESTRIL) 10 MG tablet, Take 1 tablet by mouth Daily., Disp: 90 tablet, Rfl: 1    Vital Signs:   Vitals:    23 1401   BP: 131/74   BP Location: Right arm   Patient Position: Sitting   Cuff Size: Adult   Pulse: 66   Resp: 16   Weight: 62.5 kg (137 lb 12.8 oz)   Height: 155.6 cm (61.25\")         Physical Exam:  Physical Exam  Vitals reviewed.   Constitutional:       General: She is not in acute distress.     Appearance: Normal appearance.   Neck:      Vascular: No carotid bruit.   Cardiovascular:      Rate and Rhythm: Normal rate and regular rhythm.      Heart sounds: Normal heart sounds. No murmur heard.  Pulmonary:      Effort: Pulmonary effort is normal. No respiratory distress.      Breath sounds: Normal breath sounds.   Musculoskeletal:      Right lower leg: No edema.      Left lower leg: No edema.   Neurological:      Mental Status: She is alert.   Psychiatric:         Mood and Affect: Mood normal.         Behavior: Behavior normal.         Result Review      The following data was reviewed by: GARCIA Couch on 2023:    Results for orders placed or performed in visit on 10/17/22   Basic metabolic panel    Specimen: Blood   Result Value Ref Range    Glucose 82 65 - 99 mg/dL    BUN 18 8 - 23 mg/dL    Creatinine 0.80 0.57 - 1.00 mg/dL    Sodium 139 136 - 145 mmol/L    Potassium 4.2 3.5 - 5.2 mmol/L    Chloride 102 98 - 107 mmol/L    CO2 25.9 22.0 - " 29.0 mmol/L    Calcium 9.8 8.6 - 10.5 mg/dL    BUN/Creatinine Ratio 22.5 7.0 - 25.0    Anion Gap 11.1 5.0 - 15.0 mmol/L    eGFR 83.4 >60.0 mL/min/1.73               Assessment and Plan:          Diagnoses and all orders for this visit:    1. Essential hypertension (Primary)  Assessment & Plan:  Hypertension is stable.  to monitor BP at home. Continue current meds. Continue to modify diet and lifestyle.   Ate a muffin for breakfast and a school lunch earlier today, wants to go ahead and get her labs, will send on to lab today    Orders:  -     Comprehensive Metabolic Panel  -     Lipid Panel  -     lisinopril (PRINIVIL,ZESTRIL) 10 MG tablet; Take 1 tablet by mouth Daily.  Dispense: 90 tablet; Refill: 1    2. Pre-diabetes  Assessment & Plan:  Rechecking A1C    Orders:  -     Hemoglobin A1c; Future    3. Mixed hyperlipidemia  Assessment & Plan:  Reviewed last labs, rechecking labs today     Orders:  -     Comprehensive Metabolic Panel  -     Lipid Panel        Follow Up   Return for followup pending lab results.  Patient was given instructions and counseling regarding her condition or for health maintenance advice. Please see specific information pulled into the AVS if appropriate.

## 2023-04-17 NOTE — TELEPHONE ENCOUNTER
----- Message from Cherelle Moeller MA sent at 4/17/2023  2:16 PM EDT -----  WWE needed in 10/23 with AB

## 2023-04-18 DIAGNOSIS — E78.2 MIXED HYPERLIPIDEMIA: Primary | ICD-10-CM

## 2023-04-18 LAB
ALBUMIN SERPL-MCNC: 4.4 G/DL (ref 3.5–5.2)
ALBUMIN/GLOB SERPL: 1.3 G/DL
ALP SERPL-CCNC: 91 U/L (ref 39–117)
ALT SERPL W P-5'-P-CCNC: 25 U/L (ref 1–33)
ANION GAP SERPL CALCULATED.3IONS-SCNC: 11.2 MMOL/L (ref 5–15)
AST SERPL-CCNC: 32 U/L (ref 1–32)
BILIRUB SERPL-MCNC: 0.5 MG/DL (ref 0–1.2)
BUN SERPL-MCNC: 23 MG/DL (ref 8–23)
BUN/CREAT SERPL: 24 (ref 7–25)
CALCIUM SPEC-SCNC: 10.3 MG/DL (ref 8.6–10.5)
CHLORIDE SERPL-SCNC: 103 MMOL/L (ref 98–107)
CHOLEST SERPL-MCNC: 250 MG/DL (ref 0–200)
CO2 SERPL-SCNC: 25.8 MMOL/L (ref 22–29)
CREAT SERPL-MCNC: 0.96 MG/DL (ref 0.57–1)
EGFRCR SERPLBLD CKD-EPI 2021: 67 ML/MIN/1.73
GLOBULIN UR ELPH-MCNC: 3.4 GM/DL
GLUCOSE SERPL-MCNC: 105 MG/DL (ref 65–99)
HDLC SERPL-MCNC: 34 MG/DL (ref 40–60)
LDLC SERPL CALC-MCNC: 137 MG/DL (ref 0–100)
LDLC/HDLC SERPL: 3.84 {RATIO}
POTASSIUM SERPL-SCNC: 4.6 MMOL/L (ref 3.5–5.2)
PROT SERPL-MCNC: 7.8 G/DL (ref 6–8.5)
SODIUM SERPL-SCNC: 140 MMOL/L (ref 136–145)
TRIGL SERPL-MCNC: 428 MG/DL (ref 0–150)
VLDLC SERPL-MCNC: 79 MG/DL (ref 5–40)

## 2023-08-31 ENCOUNTER — OFFICE VISIT (OUTPATIENT)
Dept: FAMILY MEDICINE CLINIC | Age: 63
End: 2023-08-31
Payer: COMMERCIAL

## 2023-08-31 VITALS
DIASTOLIC BLOOD PRESSURE: 85 MMHG | SYSTOLIC BLOOD PRESSURE: 157 MMHG | BODY MASS INDEX: 26.24 KG/M2 | TEMPERATURE: 98.1 F | OXYGEN SATURATION: 99 % | HEIGHT: 61 IN | HEART RATE: 67 BPM | WEIGHT: 139 LBS

## 2023-08-31 DIAGNOSIS — S16.1XXA ACUTE STRAIN OF NECK MUSCLE, INITIAL ENCOUNTER: Primary | ICD-10-CM

## 2023-08-31 RX ORDER — PREDNISONE 10 MG/1
TABLET ORAL
Qty: 35 TABLET | Refills: 0 | Status: SHIPPED | OUTPATIENT
Start: 2023-08-31 | End: 2023-09-14

## 2023-08-31 RX ORDER — METHOCARBAMOL 500 MG/1
500 TABLET, FILM COATED ORAL EVERY 8 HOURS PRN
Qty: 30 TABLET | Refills: 0 | Status: SHIPPED | OUTPATIENT
Start: 2023-08-31
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
## 2023-09-25 ENCOUNTER — HOSPITAL ENCOUNTER (OUTPATIENT)
Dept: GENERAL RADIOLOGY | Facility: HOSPITAL | Age: 63
Discharge: HOME | End: 2023-09-25
Admitting: NURSE PRACTITIONER
Payer: OTHER MISCELLANEOUS

## 2023-09-25 ENCOUNTER — OFFICE VISIT (OUTPATIENT)
Dept: FAMILY MEDICINE CLINIC | Age: 63
End: 2023-09-25

## 2023-09-25 VITALS
HEART RATE: 69 BPM | HEIGHT: 61 IN | SYSTOLIC BLOOD PRESSURE: 156 MMHG | WEIGHT: 141 LBS | OXYGEN SATURATION: 97 % | BODY MASS INDEX: 26.62 KG/M2 | DIASTOLIC BLOOD PRESSURE: 88 MMHG

## 2023-09-25 DIAGNOSIS — M54.2 CERVICALGIA: Primary | ICD-10-CM

## 2023-09-25 DIAGNOSIS — M54.2 CERVICALGIA: ICD-10-CM

## 2023-09-25 PROCEDURE — 72040 X-RAY EXAM NECK SPINE 2-3 VW: CPT

## 2023-09-25 PROCEDURE — 99214 OFFICE O/P EST MOD 30 MIN: CPT | Performed by: NURSE PRACTITIONER
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
## 2023-10-12 DIAGNOSIS — I10 ESSENTIAL HYPERTENSION: ICD-10-CM

## 2023-10-12 RX ORDER — LISINOPRIL 10 MG/1
10 TABLET ORAL DAILY
Qty: 90 TABLET | Refills: 0 | Status: SHIPPED | OUTPATIENT
Start: 2023-10-12

## 2023-12-13 ENCOUNTER — OFFICE VISIT (OUTPATIENT)
Dept: FAMILY MEDICINE CLINIC | Age: 63
End: 2023-12-13
Payer: COMMERCIAL

## 2023-12-13 VITALS
WEIGHT: 143 LBS | BODY MASS INDEX: 27 KG/M2 | TEMPERATURE: 98.1 F | DIASTOLIC BLOOD PRESSURE: 75 MMHG | SYSTOLIC BLOOD PRESSURE: 176 MMHG | HEART RATE: 69 BPM | HEIGHT: 61 IN

## 2023-12-13 DIAGNOSIS — R73.03 PRE-DIABETES: ICD-10-CM

## 2023-12-13 DIAGNOSIS — Z01.419 ROUTINE GYNECOLOGICAL EXAMINATION: Primary | ICD-10-CM

## 2023-12-13 DIAGNOSIS — I10 ESSENTIAL HYPERTENSION: ICD-10-CM

## 2023-12-13 DIAGNOSIS — E78.2 MIXED HYPERLIPIDEMIA: ICD-10-CM

## 2023-12-13 DIAGNOSIS — Z12.11 SCREEN FOR COLON CANCER: ICD-10-CM

## 2023-12-13 DIAGNOSIS — Z23 NEED FOR INFLUENZA VACCINATION: ICD-10-CM

## 2023-12-13 LAB
BILIRUB BLD-MCNC: NEGATIVE MG/DL
CLARITY, POC: CLEAR
COLOR UR: YELLOW
EXPIRATION DATE: ABNORMAL
GLUCOSE UR STRIP-MCNC: NEGATIVE MG/DL
KETONES UR QL: NEGATIVE
LEUKOCYTE EST, POC: NEGATIVE
Lab: ABNORMAL
NITRITE UR-MCNC: NEGATIVE MG/ML
PH UR: 5 [PH] (ref 5–8)
PROT UR STRIP-MCNC: NEGATIVE MG/DL
RBC # UR STRIP: ABNORMAL /UL
SP GR UR: 1.02 (ref 1–1.03)
UROBILINOGEN UR QL: ABNORMAL

## 2023-12-13 PROCEDURE — 87624 HPV HI-RISK TYP POOLED RSLT: CPT | Performed by: NURSE PRACTITIONER

## 2023-12-13 PROCEDURE — G0123 SCREEN CERV/VAG THIN LAYER: HCPCS

## 2023-12-13 RX ORDER — LISINOPRIL 10 MG/1
10 TABLET ORAL DAILY
Qty: 90 TABLET | Refills: 0 | Status: SHIPPED | OUTPATIENT
Start: 2023-12-13
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
## 2023-12-16 LAB
CYTOLOGIST CVX/VAG CYTO: NORMAL
CYTOLOGY CVX/VAG DOC CYTO: NORMAL
CYTOLOGY CVX/VAG DOC THIN PREP: NORMAL
DX ICD CODE: NORMAL
HIV 1 & 2 AB SER-IMP: NORMAL
HPV GENOTYPE REFLEX: NORMAL
HPV I/H RISK 4 DNA CVX QL PROBE+SIG AMP: NEGATIVE
OTHER STN SPEC: NORMAL
STAT OF ADQ CVX/VAG CYTO-IMP: NORMAL

## 2023-12-19 ENCOUNTER — LAB (OUTPATIENT)
Dept: LAB | Facility: HOSPITAL | Age: 63
End: 2023-12-19
Payer: COMMERCIAL

## 2023-12-19 ENCOUNTER — HOSPITAL ENCOUNTER (OUTPATIENT)
Dept: MAMMOGRAPHY | Facility: HOSPITAL | Age: 63
Discharge: HOME | End: 2023-12-19
Payer: COMMERCIAL

## 2023-12-19 DIAGNOSIS — E78.2 MIXED HYPERLIPIDEMIA: ICD-10-CM

## 2023-12-19 DIAGNOSIS — I10 ESSENTIAL HYPERTENSION: ICD-10-CM

## 2023-12-19 DIAGNOSIS — Z01.419 ROUTINE GYNECOLOGICAL EXAMINATION: ICD-10-CM

## 2023-12-19 DIAGNOSIS — R73.03 PRE-DIABETES: ICD-10-CM

## 2023-12-19 LAB
ALBUMIN SERPL-MCNC: 4.5 G/DL (ref 3.5–5.2)
ALBUMIN/GLOB SERPL: 1.5 G/DL
ALP SERPL-CCNC: 93 U/L (ref 39–117)
ALT SERPL W P-5'-P-CCNC: 23 U/L (ref 1–33)
ANION GAP SERPL CALCULATED.3IONS-SCNC: 11 MMOL/L (ref 5–15)
AST SERPL-CCNC: 19 U/L (ref 1–32)
BILIRUB SERPL-MCNC: 0.9 MG/DL (ref 0–1.2)
BUN SERPL-MCNC: 20 MG/DL (ref 8–23)
BUN/CREAT SERPL: 24.1 (ref 7–25)
CALCIUM SPEC-SCNC: 9.9 MG/DL (ref 8.6–10.5)
CHLORIDE SERPL-SCNC: 100 MMOL/L (ref 98–107)
CHOLEST SERPL-MCNC: 228 MG/DL (ref 0–200)
CO2 SERPL-SCNC: 26 MMOL/L (ref 22–29)
CREAT SERPL-MCNC: 0.83 MG/DL (ref 0.57–1)
EGFRCR SERPLBLD CKD-EPI 2021: 79.3 ML/MIN/1.73
GLOBULIN UR ELPH-MCNC: 3.1 GM/DL
GLUCOSE SERPL-MCNC: 130 MG/DL (ref 65–99)
HBA1C MFR BLD: 6.8 % (ref 4.8–5.6)
HDLC SERPL-MCNC: 30 MG/DL (ref 40–60)
LDLC SERPL CALC-MCNC: 120 MG/DL (ref 0–100)
LDLC/HDLC SERPL: 3.65 {RATIO}
POTASSIUM SERPL-SCNC: 4.5 MMOL/L (ref 3.5–5.2)
PROT SERPL-MCNC: 7.6 G/DL (ref 6–8.5)
SODIUM SERPL-SCNC: 137 MMOL/L (ref 136–145)
TRIGL SERPL-MCNC: 442 MG/DL (ref 0–150)
VLDLC SERPL-MCNC: 78 MG/DL (ref 5–40)

## 2023-12-19 PROCEDURE — 83036 HEMOGLOBIN GLYCOSYLATED A1C: CPT

## 2023-12-19 PROCEDURE — 80061 LIPID PANEL: CPT

## 2023-12-19 PROCEDURE — 77063 BREAST TOMOSYNTHESIS BI: CPT

## 2023-12-19 PROCEDURE — 36415 COLL VENOUS BLD VENIPUNCTURE: CPT

## 2023-12-19 PROCEDURE — 80053 COMPREHEN METABOLIC PANEL: CPT

## 2023-12-19 PROCEDURE — 77067 SCR MAMMO BI INCL CAD: CPT

## 2023-12-21 DIAGNOSIS — R92.8 ABNORMAL MAMMOGRAM OF LEFT BREAST: Primary | ICD-10-CM

## 2024-01-08 ENCOUNTER — HOSPITAL ENCOUNTER (OUTPATIENT)
Dept: MAMMOGRAPHY | Facility: HOSPITAL | Age: 64
Discharge: HOME | End: 2024-01-08
Payer: COMMERCIAL

## 2024-01-08 ENCOUNTER — HOSPITAL ENCOUNTER (OUTPATIENT)
Dept: ULTRASOUND IMAGING | Facility: HOSPITAL | Age: 64
Discharge: HOME | End: 2024-01-08
Payer: COMMERCIAL

## 2024-01-08 DIAGNOSIS — R92.8 ABNORMAL MAMMOGRAM OF LEFT BREAST: ICD-10-CM

## 2024-01-08 PROCEDURE — G0279 TOMOSYNTHESIS, MAMMO: HCPCS

## 2024-01-08 PROCEDURE — 76642 ULTRASOUND BREAST LIMITED: CPT

## 2024-01-08 PROCEDURE — 77065 DX MAMMO INCL CAD UNI: CPT

## 2024-01-12 DIAGNOSIS — I10 ESSENTIAL HYPERTENSION: ICD-10-CM

## 2024-01-12 RX ORDER — LISINOPRIL 10 MG/1
10 TABLET ORAL DAILY
Qty: 90 TABLET | Refills: 0 | Status: SHIPPED | OUTPATIENT
Start: 2024-01-12

## 2024-01-24 ENCOUNTER — OFFICE VISIT (OUTPATIENT)
Dept: FAMILY MEDICINE CLINIC | Age: 64
End: 2024-01-24
Payer: COMMERCIAL

## 2024-01-24 VITALS
OXYGEN SATURATION: 100 % | TEMPERATURE: 97.8 F | SYSTOLIC BLOOD PRESSURE: 144 MMHG | WEIGHT: 147.4 LBS | HEART RATE: 69 BPM | BODY MASS INDEX: 27.83 KG/M2 | HEIGHT: 61 IN | DIASTOLIC BLOOD PRESSURE: 88 MMHG

## 2024-01-24 DIAGNOSIS — I10 ESSENTIAL HYPERTENSION: Primary | ICD-10-CM

## 2024-01-24 DIAGNOSIS — E11.9 TYPE 2 DIABETES MELLITUS WITHOUT COMPLICATION, WITHOUT LONG-TERM CURRENT USE OF INSULIN: ICD-10-CM

## 2024-01-24 DIAGNOSIS — R92.8 ABNORMAL MAMMOGRAM OF LEFT BREAST: ICD-10-CM

## 2024-01-24 DIAGNOSIS — E78.2 MIXED HYPERLIPIDEMIA: ICD-10-CM

## 2024-01-24 NOTE — ASSESSMENT & PLAN NOTE
Reviewed labs over the last year, to work on diet and regular exercise and return fasting in a few months, will come in on her spring break

## 2024-01-24 NOTE — PROGRESS NOTES
Chief Complaint  Elevated Blood Pressure (Follow up)    Subjective          Anabelle Coleman presents to [facility]    History of Present Illness  Diabetes:  Current medication: none and prefers not to be on rx for this     Lab Results       Component                Value               Date                       HGBA1C                   6.80 (H)            12/19/2023              Hyperlipidemia  Current medication: none and prefers not to be on rx, was not fasting with last labs  Has been working on diet and regular exercise.       Lab Results       Component                Value               Date                       CHOL                     228 (H)             12/19/2023                 CHLPL                    214 (H)             04/23/2020                 TRIG                     442 (H)             12/19/2023                 HDL                      30 (L)              12/19/2023                 LDL                      120 (H)             12/19/2023                Hypertension:  Current medication: lisinopril   Tolerating Medication: Yes  Needs refills: No  Labs:  Lab Results       Component                Value               Date                       GLUCOSE                  130 (H)             12/19/2023                 BUN                      20                  12/19/2023                 CREATININE               0.83                12/19/2023                 EGFRIFNONA               62                  10/20/2021                 EGFRIFAFRI               99                  04/18/2018                 BCR                      24.1                12/19/2023                 K                        4.5                 12/19/2023                 CO2                      26.0                12/19/2023                 CALCIUM                  9.9                 12/19/2023                 ALBUMIN                  4.5                 12/19/2023                 LABIL2                    1.5                 10/19/2020                 AST                      19                  2023                 ALT                      23                  2023              Past Medical History: changes since 2023:             GYNECOLOGICAL HISTORY:        Contraception: S/P tubal ligation;    Menopause at age 52.    Last Pap was 19         PREVENTIVE HEALTH MAINTENANCE             Hepatitis C Medicare Screening: was last done 10/2008 negative           Surgical History:         cyst removed from breast;   Procedures: colonoscopy 12-31-10/hemorrhoids/diverticulosis         Family History:     Father: Coronary Artery Disease ( stents placed/3 V CABG ); Hypertension and mild dementia     Mother:  late 70's, after a fall; Hypertension; Hyperlipidemia     Brother(s): 1 brother(s) total;  smoker     Sister(s): 4 sister(s) total; 1, was stillborn      Maternal Grandmother: Breast Cancer         Social History:     Occupation: [email or web address]     Marital Status:      Children: 2 children                     History reviewed. No pertinent past medical history.    No Known Allergies     Past Surgical History:   Procedure Laterality Date    COLONOSCOPY N/A 2010        Social History     Tobacco Use    Smoking status: Never     Passive exposure: Never    Smokeless tobacco: Never   Substance Use Topics    Alcohol use: Yes     Comment: rare       History reviewed. No pertinent family history.     Health Maintenance Due   Topic Date Due    TDAP/TD VACCINES (1 - Tdap) Never done    ZOSTER VACCINE (1 of 2) Never done    COLORECTAL CANCER SCREENING  2020    ANNUAL PHYSICAL  Never done    COVID-19 Vaccine ( season) 2023        Current Outpatient Medications on File Prior to Visit   Medication Sig    lisinopril (PRINIVIL,ZESTRIL) 10 MG tablet TAKE 1 TABLET BY MOUTH DAILY     No current facility-administered medications on file  "prior to visit.       Immunization History   Administered Date(s) Administered    COVID-19 (MODERNA) 1st,2nd,3rd Dose Monovalent 02/05/2021, 03/05/2021    COVID-19 (PFIZER) Purple Cap Monovalent 12/14/2021    Flu Vaccine Intradermal Quad 18-64YR 11/09/2017    Flu Vaccine Quad PF >36MO 09/18/2020, 10/14/2021    Fluzone (or Fluarix & Flulaval for VFC) >6mos 09/18/2020, 10/14/2021, 10/17/2022, 12/13/2023    Hepatitis A 05/15/2018    Influenza, Unspecified 03/07/2022       Review of Systems   Constitutional:  Negative for fatigue and fever.   Respiratory:  Negative for cough and shortness of breath.    Cardiovascular:  Negative for chest pain, palpitations and leg swelling.        Objective     Vitals:    01/24/24 1519   BP: 144/88   BP Location: Right arm   Patient Position: Sitting   Cuff Size: Adult   Pulse: 69   Temp: 97.8 °F (36.6 °C)   TempSrc: Oral   SpO2: 100%   Weight: 66.9 kg (147 lb 6.4 oz)   Height: 155.6 cm (61.26\")            Physical Exam  Vitals reviewed.   Constitutional:       General: She is not in acute distress.     Appearance: Normal appearance.   Neck:      Vascular: No carotid bruit.   Cardiovascular:      Rate and Rhythm: Normal rate and regular rhythm.      Heart sounds: Normal heart sounds. No murmur heard.  Pulmonary:      Effort: Pulmonary effort is normal. No respiratory distress.      Breath sounds: Normal breath sounds.   Musculoskeletal:      Right lower leg: No edema.      Left lower leg: No edema.   Neurological:      Mental Status: She is alert.   Psychiatric:         Mood and Affect: Mood normal.         Behavior: Behavior normal.         Result Review :     The following data was reviewed by: GARCIA Couch on 01/24/2024:                       Assessment and Plan      Diagnoses and all orders for this visit:    1. Essential hypertension (Primary)  Assessment & Plan:  BP improved, monitor at home and continue current rx's and efforts with diet, weight loss, and regular " exercise     Orders:  -     Comprehensive Metabolic Panel; Future  -     Lipid Panel; Future    2. Mixed hyperlipidemia  Assessment & Plan:  Reviewed labs over the last year, to work on diet and regular exercise and return fasting in a few months, will come in on her spring break     Orders:  -     Comprehensive Metabolic Panel; Future  -     Lipid Panel; Future    3. Type 2 diabetes mellitus without complication, without long-term current use of insulin  Assessment & Plan:  Reviewed labs over last few years, to work on diet, weight loss, regular exercise, recheck labs in a few months     Orders:  -     Comprehensive Metabolic Panel; Future  -     Hemoglobin A1c; Future    4. Abnormal mammogram of left breast  Assessment & Plan:  Discussed, repeat will be done in 6 months from last mammogram                     Follow Up     Return for follow up for labs approx 4-1-2024.    Patient was given instructions and counseling regarding her condition or for health maintenance advice. Please see specific information pulled into the AVS if appropriate.

## 2024-01-24 NOTE — ASSESSMENT & PLAN NOTE
Reviewed labs over last few years, to work on diet, weight loss, regular exercise, recheck labs in a few months

## 2024-01-24 NOTE — ASSESSMENT & PLAN NOTE
BP improved, monitor at home and continue current rx's and efforts with diet, weight loss, and regular exercise

## 2024-04-02 ENCOUNTER — LAB (OUTPATIENT)
Dept: LAB | Facility: HOSPITAL | Age: 64
End: 2024-04-02
Payer: COMMERCIAL

## 2024-04-02 DIAGNOSIS — I10 ESSENTIAL HYPERTENSION: ICD-10-CM

## 2024-04-02 DIAGNOSIS — E11.9 TYPE 2 DIABETES MELLITUS WITHOUT COMPLICATION, WITHOUT LONG-TERM CURRENT USE OF INSULIN: ICD-10-CM

## 2024-04-02 DIAGNOSIS — E78.2 MIXED HYPERLIPIDEMIA: ICD-10-CM

## 2024-04-02 LAB
ALBUMIN SERPL-MCNC: 4.2 G/DL (ref 3.5–5.2)
ALBUMIN/GLOB SERPL: 1.5 G/DL
ALP SERPL-CCNC: 94 U/L (ref 39–117)
ALT SERPL W P-5'-P-CCNC: 20 U/L (ref 1–33)
ANION GAP SERPL CALCULATED.3IONS-SCNC: 9 MMOL/L (ref 5–15)
AST SERPL-CCNC: 16 U/L (ref 1–32)
BILIRUB SERPL-MCNC: 0.6 MG/DL (ref 0–1.2)
BUN SERPL-MCNC: 18 MG/DL (ref 8–23)
BUN/CREAT SERPL: 19.1 (ref 7–25)
CALCIUM SPEC-SCNC: 9.6 MG/DL (ref 8.6–10.5)
CHLORIDE SERPL-SCNC: 104 MMOL/L (ref 98–107)
CHOLEST SERPL-MCNC: 195 MG/DL (ref 0–200)
CO2 SERPL-SCNC: 25 MMOL/L (ref 22–29)
CREAT SERPL-MCNC: 0.94 MG/DL (ref 0.57–1)
EGFRCR SERPLBLD CKD-EPI 2021: 68.3 ML/MIN/1.73
GLOBULIN UR ELPH-MCNC: 2.8 GM/DL
GLUCOSE SERPL-MCNC: 125 MG/DL (ref 65–99)
HBA1C MFR BLD: 6.5 % (ref 4.8–5.6)
HDLC SERPL-MCNC: 35 MG/DL (ref 40–60)
LDLC SERPL CALC-MCNC: 120 MG/DL (ref 0–100)
LDLC/HDLC SERPL: 3.27 {RATIO}
POTASSIUM SERPL-SCNC: 4.5 MMOL/L (ref 3.5–5.2)
PROT SERPL-MCNC: 7 G/DL (ref 6–8.5)
SODIUM SERPL-SCNC: 138 MMOL/L (ref 136–145)
TRIGL SERPL-MCNC: 227 MG/DL (ref 0–150)
VLDLC SERPL-MCNC: 40 MG/DL (ref 5–40)

## 2024-04-02 PROCEDURE — 80061 LIPID PANEL: CPT

## 2024-04-02 PROCEDURE — 80053 COMPREHEN METABOLIC PANEL: CPT

## 2024-04-02 PROCEDURE — 36415 COLL VENOUS BLD VENIPUNCTURE: CPT

## 2024-04-02 PROCEDURE — 83036 HEMOGLOBIN GLYCOSYLATED A1C: CPT

## 2024-04-13 DIAGNOSIS — I10 ESSENTIAL HYPERTENSION: ICD-10-CM

## 2024-04-15 RX ORDER — LISINOPRIL 10 MG/1
10 TABLET ORAL DAILY
Qty: 90 TABLET | Refills: 0 | Status: SHIPPED | OUTPATIENT
Start: 2024-04-15

## 2024-07-01 ENCOUNTER — TELEPHONE (OUTPATIENT)
Dept: FAMILY MEDICINE CLINIC | Age: 64
End: 2024-07-01
Payer: COMMERCIAL

## 2024-07-01 NOTE — TELEPHONE ENCOUNTER
----- Message from Saadia RILEY sent at 1/9/2024  4:14 PM EST -----  See dg mammogram:  the radiologist recommendation, she can come in to discuss or be put on a reminder list to do a recheck in 6 months.   IMPRESSION:  Mammographic left breast focal asymmetries by ultrasound correspond to a small benign cyst and an  additional larger probably benign simple versus minimally complex cyst.  Six-month follow-up  mammogram and ultrasound of the suspected larger cyst is recommended to ensure stability.

## 2024-07-10 DIAGNOSIS — I10 ESSENTIAL HYPERTENSION: ICD-10-CM

## 2024-07-10 RX ORDER — LISINOPRIL 10 MG/1
10 TABLET ORAL DAILY
Qty: 90 TABLET | Refills: 0 | Status: CANCELLED | OUTPATIENT
Start: 2024-07-10

## 2024-07-10 NOTE — TELEPHONE ENCOUNTER
"    Caller: Anabelle Coleman \"Olga Lidia\"    Relationship: Self    Best call back number: [phone]    Requested Prescriptions:   Requested Prescriptions     Pending Prescriptions Disp Refills    lisinopril (PRINIVIL,ZESTRIL) 10 MG tablet 90 tablet 0     Sig: Take 1 tablet by mouth Daily.        Pharmacy where request should be sent: [facility] [id number] [place], [place] - [address] [facility] - [phone]  - [phone]     Last office visit with prescribing clinician: 1/24/2024   Last telemedicine visit with prescribing clinician: Visit date not found   Next office visit with prescribing clinician: Visit date not found     Additional details provided by patient: PATIENT IS GOING OUT OF TOWN AND WILL RUN OUT OF MEDICATION WHILE ON VACATION.  WOULD LIKE TO GET THIS REFILLED BEFORE MONDAY.     Does the patient have less than a 3 day supply:  [] Yes  [] No    Would you like a call back once the refill request has been completed: [] Yes [] No    If the office needs to give you a call back, can they leave a voicemail: [] Yes [] No    Abigail Nolan   07/10/24 09:04 EDT         "

## 2024-07-15 DIAGNOSIS — I10 ESSENTIAL HYPERTENSION: ICD-10-CM

## 2024-07-15 RX ORDER — LISINOPRIL 10 MG/1
10 TABLET ORAL DAILY
Qty: 90 TABLET | Refills: 0 | Status: SHIPPED | OUTPATIENT
Start: 2024-07-15

## 2024-07-25 ENCOUNTER — LAB (OUTPATIENT)
Dept: LAB | Facility: HOSPITAL | Age: 64
End: 2024-07-25
Payer: COMMERCIAL

## 2024-07-25 ENCOUNTER — OFFICE VISIT (OUTPATIENT)
Dept: FAMILY MEDICINE CLINIC | Age: 64
End: 2024-07-25
Payer: COMMERCIAL

## 2024-07-25 VITALS
DIASTOLIC BLOOD PRESSURE: 83 MMHG | HEART RATE: 65 BPM | WEIGHT: 140.8 LBS | SYSTOLIC BLOOD PRESSURE: 147 MMHG | BODY MASS INDEX: 26.58 KG/M2 | TEMPERATURE: 98 F | HEIGHT: 61 IN

## 2024-07-25 DIAGNOSIS — E78.2 MIXED HYPERLIPIDEMIA: ICD-10-CM

## 2024-07-25 DIAGNOSIS — E11.9 TYPE 2 DIABETES MELLITUS WITHOUT COMPLICATION, WITHOUT LONG-TERM CURRENT USE OF INSULIN: ICD-10-CM

## 2024-07-25 DIAGNOSIS — I10 ESSENTIAL HYPERTENSION: ICD-10-CM

## 2024-07-25 DIAGNOSIS — I10 ESSENTIAL HYPERTENSION: Primary | ICD-10-CM

## 2024-07-25 DIAGNOSIS — R92.8 ABNORMAL MAMMOGRAM OF LEFT BREAST: ICD-10-CM

## 2024-07-25 LAB
ALBUMIN SERPL-MCNC: 4.4 G/DL (ref 3.5–5.2)
ALBUMIN UR-MCNC: <1.2 MG/DL
ALBUMIN/GLOB SERPL: 1.5 G/DL
ALP SERPL-CCNC: 100 U/L (ref 39–117)
ALT SERPL W P-5'-P-CCNC: 23 U/L (ref 1–33)
ANION GAP SERPL CALCULATED.3IONS-SCNC: 10.5 MMOL/L (ref 5–15)
AST SERPL-CCNC: 22 U/L (ref 1–32)
BILIRUB SERPL-MCNC: 1 MG/DL (ref 0–1.2)
BUN SERPL-MCNC: 19 MG/DL (ref 8–23)
BUN/CREAT SERPL: 21.8 (ref 7–25)
CALCIUM SPEC-SCNC: 9.6 MG/DL (ref 8.6–10.5)
CHLORIDE SERPL-SCNC: 100 MMOL/L (ref 98–107)
CHOLEST SERPL-MCNC: 252 MG/DL (ref 0–200)
CO2 SERPL-SCNC: 25.5 MMOL/L (ref 22–29)
CREAT SERPL-MCNC: 0.87 MG/DL (ref 0.57–1)
CREAT UR-MCNC: 33.4 MG/DL
EGFRCR SERPLBLD CKD-EPI 2021: 74.5 ML/MIN/1.73
GLOBULIN UR ELPH-MCNC: 3 GM/DL
GLUCOSE SERPL-MCNC: 116 MG/DL (ref 65–99)
HBA1C MFR BLD: 6.5 % (ref 4.8–5.6)
HDLC SERPL-MCNC: 36 MG/DL (ref 40–60)
LDLC SERPL CALC-MCNC: 147 MG/DL (ref 0–100)
LDLC/HDLC SERPL: 3.95 {RATIO}
MICROALBUMIN/CREAT UR: NORMAL MG/G{CREAT}
POTASSIUM SERPL-SCNC: 4.4 MMOL/L (ref 3.5–5.2)
PROT SERPL-MCNC: 7.4 G/DL (ref 6–8.5)
SODIUM SERPL-SCNC: 136 MMOL/L (ref 136–145)
TRIGL SERPL-MCNC: 369 MG/DL (ref 0–150)
VLDLC SERPL-MCNC: 69 MG/DL (ref 5–40)

## 2024-07-25 PROCEDURE — 82043 UR ALBUMIN QUANTITATIVE: CPT

## 2024-07-25 PROCEDURE — 82570 ASSAY OF URINE CREATININE: CPT

## 2024-07-25 PROCEDURE — 83036 HEMOGLOBIN GLYCOSYLATED A1C: CPT

## 2024-07-25 PROCEDURE — 99214 OFFICE O/P EST MOD 30 MIN: CPT | Performed by: NURSE PRACTITIONER

## 2024-07-25 PROCEDURE — 36415 COLL VENOUS BLD VENIPUNCTURE: CPT

## 2024-07-25 PROCEDURE — 80061 LIPID PANEL: CPT

## 2024-07-25 PROCEDURE — 80053 COMPREHEN METABOLIC PANEL: CPT

## 2024-07-25 NOTE — ASSESSMENT & PLAN NOTE
Had to pay $800 for the follow up, does not want to schedule if inusrance won't cover, I advised her to get the follow up testing; she will call the insurance and let me know if she is willing to schedule this

## 2024-07-25 NOTE — ASSESSMENT & PLAN NOTE
Hypertension: bp up today, advised to monitor BP at home. Continue current med. Continue to modify diet and lifestyle. Will check labs today

## 2024-07-25 NOTE — PROGRESS NOTES
Chief Complaint  Hypertension (6 month follow up HTN, HLD. )    Subjective          Anabelle Coleman presents to [facility]    History of Present Illness  Hyperlipidemia  Current medication: none   Tries to eat healthy and exercise   Lab Results       Component                Value               Date                       CHOL                     195                 04/02/2024                 CHLPL                    214 (H)             04/23/2020                 TRIG                     227 (H)             04/02/2024                 HDL                      35 (L)              04/02/2024                 LDL                      120 (H)             04/02/2024                Hypertension:  Current medication: lisinopril  Tolerating Medication: Yes  Checking BP at home and it is: not checking   Needs refills: no, was sent last week   Labs:  Lab Results       Component                Value               Date                       GLUCOSE                  125 (H)             04/02/2024                 BUN                      18                  04/02/2024                 CREATININE               0.94                04/02/2024                 EGFRIFNONA               62                  10/20/2021                 EGFRIFAFRI               99                  04/18/2018                 BCR                      19.1                04/02/2024                 K                        4.5                 04/02/2024                 CO2                      25.0                04/02/2024                 CALCIUM                  9.6                 04/02/2024                 ALBUMIN                  4.2                 04/02/2024                 LABIL2                   1.5                 10/19/2020                 AST                      16                  04/02/2024                 ALT                      20                  04/02/2024              Diabetes:  Weight is down 7 pounds   Current  medication: none   At home BS ranges: not checking  Lab Results       Component                Value               Date                       HGBA1C                   6.50 (H)            2024              Does not do BSE, had to pay around $800 for the follow up mammogram and ultrasound not covered by insurance, so had not agreed to get the 6 month follow up     Past Medical History: changes since 2024:             GYNECOLOGICAL HISTORY:        Contraception: S/P tubal ligation;    Menopause at age 52.    Last Pap was 19         PREVENTIVE HEALTH MAINTENANCE         Colonoscopy 6- polyp repeat in 5 years     Hepatitis C Medicare Screening: was last done 10/2008 negative           Surgical History:         cyst removed from breast;   Procedures: colonoscopy 12-31-10/hemorrhoids/diverticulosis         Family History:       Father:  84; Coronary Artery Disease ( stents placed/3 V CABG ); Hypertension and mild dementia     Mother:  late 70's, after a fall; Hypertension; Hyperlipidemia     Brother(s): 1 brother(s) total;  smoker     Sister(s): 4 sister(s) total; 1, was stillborn      Maternal Grandmother: ; Breast Cancer         Social History:       Occupation: [employer]     Marital Status:      Children: 2 children               History reviewed. No pertinent past medical history.    No Known Allergies     Past Surgical History:   Procedure Laterality Date    COLONOSCOPY N/A 2010    COLONOSCOPY  06/10/2024    polyp, next due 5 years        Social History     Tobacco Use    Smoking status: Never     Passive exposure: Never    Smokeless tobacco: Never   Substance Use Topics    Alcohol use: Yes     Comment: rare       History reviewed. No pertinent family history.     Health Maintenance Due   Topic Date Due    URINE MICROALBUMIN  Never done    Pneumococcal Vaccine 0-64 (1 of 2 - PCV) Never done    DIABETIC EYE EXAM  Never done  "   TDAP/TD VACCINES (1 - Tdap) Never done    ZOSTER VACCINE (1 of 2) Never done    ANNUAL PHYSICAL  Never done    DIABETIC FOOT EXAM  Never done    HEMOGLOBIN A1C  10/02/2024        Current Outpatient Medications on File Prior to Visit   Medication Sig    lisinopril (PRINIVIL,ZESTRIL) 10 MG tablet TAKE 1 TABLET BY MOUTH DAILY     No current facility-administered medications on file prior to visit.       Immunization History   Administered Date(s) Administered    COVID-19 (MODERNA) 1st,2nd,3rd Dose Monovalent 02/05/2021, 03/05/2021    COVID-19 (PFIZER) Purple Cap Monovalent 12/14/2021    Flu Vaccine Intradermal Quad 18-64YR 11/09/2017    Flu Vaccine Quad PF >36MO 09/18/2020, 10/14/2021    Fluzone (or Fluarix & Flulaval for VFC) >6mos 09/18/2020, 10/14/2021, 10/17/2022, 12/13/2023    Hepatitis A 05/15/2018    Influenza, Unspecified 03/07/2022       Review of Systems   Constitutional:  Negative for fatigue and fever.   Respiratory:  Negative for cough and shortness of breath.    Cardiovascular:  Negative for chest pain, palpitations and leg swelling.        Objective     Vitals:    07/25/24 0909   BP: 147/83   BP Location: Right arm   Patient Position: Sitting   Cuff Size: Adult   Pulse: 65   Temp: 98 °F (36.7 °C)   TempSrc: Oral   Weight: 63.9 kg (140 lb 12.8 oz)   Height: 155.6 cm (61.26\")            Physical Exam  Constitutional:       Appearance: Normal appearance.   Neck:      Vascular: No carotid bruit.   Cardiovascular:      Rate and Rhythm: Normal rate and regular rhythm.      Pulses:           Posterior tibial pulses are 2+ on the right side and 2+ on the left side.      Heart sounds: No murmur heard.  Pulmonary:      Effort: No respiratory distress.      Breath sounds: Normal breath sounds.   Chest:   Breasts:     Right: Normal. No mass, nipple discharge or skin change.      Left: Normal. No mass, nipple discharge or skin change.   Musculoskeletal:         General: No swelling.   Feet:      Right foot:      " Protective Sensation: 3 sites tested.  3 sites sensed.      Skin integrity: Skin integrity normal. No ulcer or blister.      Toenail Condition: Right toenails are normal.      Left foot:      Protective Sensation: 3 sites tested.  3 sites sensed.      Skin integrity: Skin integrity normal. No ulcer or blister.      Toenail Condition: Left toenails are normal.      Comments: Diabetic Foot Exam Performed and Monofilament Test Performed     Lymphadenopathy:      Upper Body:      Right upper body: No axillary adenopathy.      Left upper body: No axillary adenopathy.   Neurological:      Mental Status: She is alert.   Psychiatric:         Mood and Affect: Mood normal.         Thought Content: Thought content normal.         Result Review :     The following data was reviewed by: GARCIA Couch on 07/25/2024:                       Assessment and Plan      Diagnoses and all orders for this visit:    1. Essential hypertension (Primary)  Assessment & Plan:  Hypertension: bp up today, advised to monitor BP at home. Continue current med. Continue to modify diet and lifestyle. Will check labs today       Orders:  -     Comprehensive Metabolic Panel; Future  -     Lipid Panel; Future    2. Mixed hyperlipidemia  Assessment & Plan:   Advised to work on diet and regular exercise     Orders:  -     Comprehensive Metabolic Panel; Future  -     Lipid Panel; Future    3. Abnormal mammogram of left breast  Assessment & Plan:  Had to pay $800 for the follow up, does not want to schedule if inusrance won't cover, I advised her to get the follow up testing; she will call the insurance and let me know if she is willing to schedule this       4. Type 2 diabetes mellitus without complication, without long-term current use of insulin  Assessment & Plan:  Will recheck her labs today, discussed checking her feet regularly     Orders:  -     Comprehensive Metabolic Panel; Future  -     Lipid Panel; Future  -     Hemoglobin A1c;  Future  -     Microalbumin / Creatinine Urine Ratio - Urine, Clean Catch; Future                    Follow Up     Return for followup pending lab results.    Patient was given instructions and counseling regarding her condition or for health maintenance advice. Please see specific information pulled into the AVS if appropriate.

## 2024-10-10 DIAGNOSIS — I10 ESSENTIAL HYPERTENSION: ICD-10-CM

## 2024-10-10 RX ORDER — LISINOPRIL 10 MG/1
10 TABLET ORAL DAILY
Qty: 90 TABLET | Refills: 0 | Status: SHIPPED | OUTPATIENT
Start: 2024-10-10

## 2024-10-10 NOTE — TELEPHONE ENCOUNTER
Rx Refill Note  Requested Prescriptions     Pending Prescriptions Disp Refills    lisinopril (PRINIVIL,ZESTRIL) 10 MG tablet [Pharmacy Med Name: LISINOPRIL 10 MG TABLET] 90 tablet 0     Sig: TAKE 1 TABLET BY MOUTH DAILY      Last office visit with prescribing clinician: 7/25/2024   Last telemedicine visit with prescribing clinician: Visit date not found   Next office visit with prescribing clinician: 1/29/2025  ACE Inhibitors Protocol Failed     Saadia Lopez LPN  10/10/24, 15:05 EDT

## 2025-01-07 DIAGNOSIS — I10 ESSENTIAL HYPERTENSION: ICD-10-CM

## 2025-01-07 RX ORDER — LISINOPRIL 10 MG/1
10 TABLET ORAL DAILY
Qty: 90 TABLET | Refills: 0 | Status: SHIPPED | OUTPATIENT
Start: 2025-01-07

## 2025-01-07 NOTE — TELEPHONE ENCOUNTER
"    Caller: Anabelle Coleman \"Olga Lidia\"    Relationship: Self    Best call back number: [phone]    Requested Prescriptions:     lisinopril (PRINIVIL,ZESTRIL) 10 MG tablet  10 mg, Daily          Pharmacy where request should be sent:      [facility] [id number] [facility] [place] - [address] - [phone]  - [phone]     Last office visit with prescribing clinician: 7/25/2024   Last telemedicine visit with prescribing clinician: Visit date not found   Next office visit with prescribing clinician: 2/4/2025     Additional details provided by patient: PATIENT HAS AN UPCOMING APPOINTMENT BUT ONLY HAS A WEEKS SUP[PLY OF MEDICATION    Does the patient have less than a 3 day supply:  [] Yes  [x] No    Would you like a call back once the refill request has been completed: [] Yes [] No    If the office needs to give you a call back, can they leave a voicemail: [] Yes [] No    Abigail Virgen Rep   01/07/25 11:39 EST     "

## 2025-01-07 NOTE — TELEPHONE ENCOUNTER
Rx Refill Note  Requested Prescriptions     Pending Prescriptions Disp Refills    lisinopril (PRINIVIL,ZESTRIL) 10 MG tablet 90 tablet 0     Sig: Take 1 tablet by mouth Daily.      Last office visit with prescribing clinician: 7/25/2024   Last telemedicine visit with prescribing clinician: Visit date not found   Next office visit with prescribing clinician: 2/4/2025    Will run out before next appt.     Saadia Lopez LPN  01/07/25, 14:27 EST

## 2025-02-04 ENCOUNTER — LAB (OUTPATIENT)
Dept: LAB | Facility: HOSPITAL | Age: 65
End: 2025-02-04
Payer: COMMERCIAL

## 2025-02-04 ENCOUNTER — OFFICE VISIT (OUTPATIENT)
Dept: FAMILY MEDICINE CLINIC | Age: 65
End: 2025-02-04
Payer: COMMERCIAL

## 2025-02-04 VITALS
DIASTOLIC BLOOD PRESSURE: 86 MMHG | TEMPERATURE: 97.5 F | BODY MASS INDEX: 27.94 KG/M2 | WEIGHT: 148 LBS | HEIGHT: 61 IN | HEART RATE: 65 BPM | SYSTOLIC BLOOD PRESSURE: 142 MMHG | OXYGEN SATURATION: 97 %

## 2025-02-04 DIAGNOSIS — E11.9 TYPE 2 DIABETES MELLITUS WITHOUT COMPLICATION, WITHOUT LONG-TERM CURRENT USE OF INSULIN: ICD-10-CM

## 2025-02-04 DIAGNOSIS — Z00.00 ROUTINE GENERAL MEDICAL EXAMINATION AT A HEALTH CARE FACILITY: Primary | ICD-10-CM

## 2025-02-04 DIAGNOSIS — E78.2 MIXED HYPERLIPIDEMIA: ICD-10-CM

## 2025-02-04 DIAGNOSIS — I10 ESSENTIAL HYPERTENSION: ICD-10-CM

## 2025-02-04 DIAGNOSIS — Z12.31 SCREENING MAMMOGRAM FOR BREAST CANCER: ICD-10-CM

## 2025-02-04 DIAGNOSIS — Z00.00 ROUTINE GENERAL MEDICAL EXAMINATION AT A HEALTH CARE FACILITY: ICD-10-CM

## 2025-02-04 LAB
ALBUMIN SERPL-MCNC: 4.3 G/DL (ref 3.5–5.2)
ALBUMIN/GLOB SERPL: 1.4 G/DL
ALP SERPL-CCNC: 101 U/L (ref 39–117)
ALT SERPL W P-5'-P-CCNC: 31 U/L (ref 1–33)
ANION GAP SERPL CALCULATED.3IONS-SCNC: 10 MMOL/L (ref 5–15)
AST SERPL-CCNC: 26 U/L (ref 1–32)
BASOPHILS # BLD AUTO: 0.02 10*3/MM3 (ref 0–0.2)
BASOPHILS NFR BLD AUTO: 0.3 % (ref 0–1.5)
BILIRUB SERPL-MCNC: 0.7 MG/DL (ref 0–1.2)
BUN SERPL-MCNC: 19 MG/DL (ref 8–23)
BUN/CREAT SERPL: 22.9 (ref 7–25)
CALCIUM SPEC-SCNC: 9.3 MG/DL (ref 8.6–10.5)
CHLORIDE SERPL-SCNC: 102 MMOL/L (ref 98–107)
CHOLEST SERPL-MCNC: 216 MG/DL (ref 0–200)
CO2 SERPL-SCNC: 26 MMOL/L (ref 22–29)
CREAT SERPL-MCNC: 0.83 MG/DL (ref 0.57–1)
DEPRECATED RDW RBC AUTO: 42.8 FL (ref 37–54)
EGFRCR SERPLBLD CKD-EPI 2021: 78.8 ML/MIN/1.73
EOSINOPHIL # BLD AUTO: 0.16 10*3/MM3 (ref 0–0.4)
EOSINOPHIL NFR BLD AUTO: 2.2 % (ref 0.3–6.2)
ERYTHROCYTE [DISTWIDTH] IN BLOOD BY AUTOMATED COUNT: 13.1 % (ref 12.3–15.4)
GLOBULIN UR ELPH-MCNC: 3 GM/DL
GLUCOSE SERPL-MCNC: 102 MG/DL (ref 65–99)
HBA1C MFR BLD: 7 % (ref 4.8–5.6)
HCT VFR BLD AUTO: 39.2 % (ref 34–46.6)
HDLC SERPL-MCNC: 31 MG/DL (ref 40–60)
HGB BLD-MCNC: 12.8 G/DL (ref 12–15.9)
IMM GRANULOCYTES # BLD AUTO: 0.01 10*3/MM3 (ref 0–0.05)
IMM GRANULOCYTES NFR BLD AUTO: 0.1 % (ref 0–0.5)
LDLC SERPL CALC-MCNC: 120 MG/DL (ref 0–100)
LDLC/HDLC SERPL: 3.6 {RATIO}
LYMPHOCYTES # BLD AUTO: 2.77 10*3/MM3 (ref 0.7–3.1)
LYMPHOCYTES NFR BLD AUTO: 37.5 % (ref 19.6–45.3)
MCH RBC QN AUTO: 28.8 PG (ref 26.6–33)
MCHC RBC AUTO-ENTMCNC: 32.7 G/DL (ref 31.5–35.7)
MCV RBC AUTO: 88.1 FL (ref 79–97)
MONOCYTES # BLD AUTO: 0.55 10*3/MM3 (ref 0.1–0.9)
MONOCYTES NFR BLD AUTO: 7.5 % (ref 5–12)
NEUTROPHILS NFR BLD AUTO: 3.87 10*3/MM3 (ref 1.7–7)
NEUTROPHILS NFR BLD AUTO: 52.4 % (ref 42.7–76)
PLATELET # BLD AUTO: 225 10*3/MM3 (ref 140–450)
PMV BLD AUTO: 9.1 FL (ref 6–12)
POTASSIUM SERPL-SCNC: 4.3 MMOL/L (ref 3.5–5.2)
PROT SERPL-MCNC: 7.3 G/DL (ref 6–8.5)
RBC # BLD AUTO: 4.45 10*6/MM3 (ref 3.77–5.28)
SODIUM SERPL-SCNC: 138 MMOL/L (ref 136–145)
TRIGL SERPL-MCNC: 367 MG/DL (ref 0–150)
TSH SERPL DL<=0.05 MIU/L-ACNC: 1.08 UIU/ML (ref 0.27–4.2)
VLDLC SERPL-MCNC: 65 MG/DL (ref 5–40)
WBC NRBC COR # BLD AUTO: 7.38 10*3/MM3 (ref 3.4–10.8)

## 2025-02-04 PROCEDURE — 80050 GENERAL HEALTH PANEL: CPT

## 2025-02-04 PROCEDURE — 80061 LIPID PANEL: CPT

## 2025-02-04 PROCEDURE — 99396 PREV VISIT EST AGE 40-64: CPT | Performed by: NURSE PRACTITIONER

## 2025-02-04 PROCEDURE — 36415 COLL VENOUS BLD VENIPUNCTURE: CPT

## 2025-02-04 PROCEDURE — 83036 HEMOGLOBIN GLYCOSYLATED A1C: CPT

## 2025-02-04 NOTE — ASSESSMENT & PLAN NOTE
Switch to losartan, repeat bp improved, log sheet given, to monitor at home, and drop off a log.

## 2025-02-04 NOTE — PROGRESS NOTES
Chief Complaint  Annual Exam    Subjective          Anabelle Coleman presents to [facility]    History of Present Illness  General Health Questions  Regular exercise as least 3 days a week: yes   Follows a healthy diet: tries   Wears seat belt always: yes   Drinks Alcohol: rarely   Uses Recreational drugs: none   Uses tobacco products: none   UTD on Tetanus vaccine: over 10 years   Does BSE: yes   Last mammogram: past due , last screen was 12-19-23  Last colon screen:6-2024  Otptomerist : over due  Dentist :     Diabetes:  Current medication: none   At home BS ranges: not checking   Lab Results       Component                Value               Date                       HGBA1C                   6.50 (H)            07/25/2024                Hypertension:  Current medication: lisinopril   Tolerating Medication: cough   Checking BP at home and it is: not checking   Needs refills: Yes to [facility]   Labs:  Lab Results       Component                Value               Date                       GLUCOSE                  116 (H)             07/25/2024                 BUN                      19                  07/25/2024                 CREATININE               0.87                07/25/2024                 EGFRIFNONA               62                  10/20/2021                 EGFRIFAFRI               99                  04/18/2018                 BCR                      21.8                07/25/2024                 K                        4.4                 07/25/2024                 CO2                      25.5                07/25/2024                 CALCIUM                  9.6                 07/25/2024                 ALBUMIN                  4.4                 07/25/2024                 LABIL2                   1.5                 10/19/2020                 AST                      22                  07/25/2024                 ALT                      23                   2024                Hyperlipidemia  Current medication: none     Lab Results       Component                Value               Date                       CHOL                     252 (H)             2024                 CHLPL                    214 (H)             2020                 TRIG                     369 (H)             2024                 HDL                      36 (L)              2024                 LDL                      147 (H)             2024              Past Medical History: changes since 2024:             GYNECOLOGICAL HISTORY:        Contraception: S/P tubal ligation;    Menopause at age 52.    Last Pap was 2023 / hpv/ negative         PREVENTIVE HEALTH MAINTENANCE         Colonoscopy 6- polyp repeat in 5 years     Hepatitis C Medicare Screening: was last done 10/2008 negative           Surgical History:         cyst removed from breast;   Procedures: colonoscopy 12-31-10/hemorrhoids/diverticulosis         Family History:        Father:  84; Coronary Artery Disease ( stents placed/3 V CABG ); Hypertension and mild dementia     Mother:  late 70's, after a fall; Hypertension; Hyperlipidemia     Brother(s): 1 brother(s) total;  smoker     Sister(s): 4 sister(s) total; 1, was stillborn      Maternal Grandmother: ; Breast Cancer         Social History:        Occupation: [employer]     Marital Status:      Children: 2 children                       History reviewed. No pertinent past medical history.    No Known Allergies     Past Surgical History:   Procedure Laterality Date    COLONOSCOPY N/A 2010    COLONOSCOPY  06/10/2024    polyp, next due 5 years        Social History     Tobacco Use    Smoking status: Never     Passive exposure: Never    Smokeless tobacco: Never   Substance Use Topics    Alcohol use: Yes     Comment: rare       History reviewed. No pertinent family  "history.     Health Maintenance Due   Topic Date Due    DIABETIC EYE EXAM  Never done    Pneumococcal Vaccine 0-64 (1 of 2 - PCV) Never done    TDAP/TD VACCINES (1 - Tdap) Never done    ZOSTER VACCINE (1 of 2) Never done    ANNUAL PHYSICAL  Never done    INFLUENZA VACCINE  07/01/2024    COVID-19 Vaccine (4 - 2024-25 season) 09/01/2024        No current outpatient medications on file prior to visit.     No current facility-administered medications on file prior to visit.       Immunization History   Administered Date(s) Administered    COVID-19 (MODERNA) 1st,2nd,3rd Dose Monovalent 02/05/2021, 03/05/2021    COVID-19 (PFIZER) Purple Cap Monovalent 12/14/2021    Flu Vaccine Intradermal Quad 18-64YR 11/09/2017    Flu Vaccine Quad PF >36MO 09/18/2020, 10/14/2021    Fluzone (or Fluarix & Flulaval for VFC) >6mos 09/18/2020, 10/14/2021, 10/17/2022, 12/13/2023    Hepatitis A 05/15/2018    Influenza, Unspecified 03/07/2022       Review of Systems   Constitutional:  Negative for fatigue and fever.   HENT:  Negative for ear pain and sore throat.    Eyes:  Negative for blurred vision.   Respiratory:  Positive for cough (frequently since taking the ACE). Negative for shortness of breath.    Cardiovascular:  Negative for chest pain, palpitations and leg swelling.   Gastrointestinal:  Negative for abdominal pain, constipation, diarrhea, nausea and vomiting.   Genitourinary:  Negative for breast lump.   Musculoskeletal:  Negative for arthralgias and myalgias.   Skin:  Negative for rash.   Neurological:  Negative for dizziness, weakness and headache.   Psychiatric/Behavioral:  Negative for sleep disturbance and depressed mood.         Objective     Vitals:    02/04/25 1438 02/04/25 1500   BP: 164/83 142/86   BP Location: Right arm Right arm   Patient Position: Sitting Sitting   Cuff Size: Adult Adult   Pulse: 65    Temp: 97.5 °F (36.4 °C)    TempSrc: Oral    SpO2: 97%    Weight: 67.1 kg (148 lb)    Height: 155.6 cm (61.26\")       "       Physical Exam  Vitals reviewed.   Constitutional:       General: She is not in acute distress.     Appearance: Normal appearance. She is well-developed.   HENT:      Head: Normocephalic. Hair is normal.      Right Ear: Hearing, tympanic membrane, ear canal and external ear normal. No decreased hearing noted. No drainage.      Left Ear: Hearing, tympanic membrane, ear canal and external ear normal. No decreased hearing noted.      Nose: Nose normal. No nasal deformity.      Mouth/Throat:      Mouth: Mucous membranes are moist.   Eyes:      General: Lids are normal.      Extraocular Movements: Extraocular movements intact.      Conjunctiva/sclera: Conjunctivae normal.      Pupils: Pupils are equal, round, and reactive to light.   Neck:      Thyroid: No thyromegaly.      Vascular: No carotid bruit or JVD.   Cardiovascular:      Rate and Rhythm: Normal rate and regular rhythm.      Pulses: Normal pulses.      Heart sounds: Normal heart sounds. No murmur heard.     No friction rub. No gallop.   Pulmonary:      Effort: Pulmonary effort is normal. No respiratory distress.      Breath sounds: Normal breath sounds. No wheezing.   Chest:   Breasts:     Right: Normal. No mass, nipple discharge or skin change.      Left: Normal. No mass, nipple discharge or skin change.   Abdominal:      General: Bowel sounds are normal.      Palpations: Abdomen is soft. There is no mass.      Tenderness: There is no abdominal tenderness.   Musculoskeletal:         General: No tenderness or deformity. Normal range of motion.      Cervical back: Normal range of motion and neck supple.   Lymphadenopathy:      Cervical: No cervical adenopathy.      Upper Body:      Right upper body: No axillary adenopathy.      Left upper body: No axillary adenopathy.   Skin:     General: Skin is warm and dry.      Findings: No erythema or rash.   Neurological:      Mental Status: She is alert and oriented to person, place, and time.      Motor: No abnormal  muscle tone.      Gait: Gait normal.      Deep Tendon Reflexes: Reflexes are normal and symmetric.   Psychiatric:         Mood and Affect: Mood normal.         Behavior: Behavior normal.         Thought Content: Thought content normal.         Judgment: Judgment normal.         Result Review :     The following data was reviewed by: GARCIA Couch on 02/04/2025:                       Assessment and Plan      Diagnoses and all orders for this visit:    1. Routine general medical examination at a health care facility (Primary)  Assessment & Plan:  She has eaten today, will go ahead and get her labs   Advise regular exercise, healthy eating, always wear seat belts.  Immunizations discussed, declines any updates today.  Advised to check with her pharmacy on Tdap coverage.   Advised to set up  yearly optometry and dental exams.    Advised monthly BSE       Orders:  -     Comprehensive Metabolic Panel; Future  -     CBC & Differential; Future  -     TSH; Future  -     Lipid Panel; Future  -     Hemoglobin A1c; Future    2. Type 2 diabetes mellitus without complication, without long-term current use of insulin  Assessment & Plan:  Rechecking labs, work on diet and exercise, advised to monitor sugars     Orders:  -     Comprehensive Metabolic Panel; Future  -     Hemoglobin A1c; Future    3. Mixed hyperlipidemia  Assessment & Plan:   Rechecking labs, she did not want to return fasting for labs, had taco's for lunch today     Orders:  -     Comprehensive Metabolic Panel; Future  -     Lipid Panel; Future    4. Essential hypertension  Assessment & Plan:  Switch to losartan, repeat bp improved, log sheet given, to monitor at home, and drop off a log.       Orders:  -     Comprehensive Metabolic Panel; Future  -     losartan (Cozaar) 50 MG tablet; Take 1 tablet by mouth Daily.  Dispense: 30 tablet; Refill: 2    5. Screening mammogram for breast cancer  Assessment & Plan:  Will set up screening mammogram.      Orders:  -     Mammo Screening Digital Tomosynthesis Bilateral With CAD; Future                  Follow Up     Return for followup pending lab results.    Patient was given instructions and counseling regarding her condition or for health maintenance advice. Please see specific information pulled into the AVS if appropriate.

## 2025-02-04 NOTE — ASSESSMENT & PLAN NOTE
She has eaten today, will go ahead and get her labs   Advise regular exercise, healthy eating, always wear seat belts.  Immunizations discussed, declines any updates today.  Advised to check with her pharmacy on Tdap coverage.   Advised to set up  yearly optometry and dental exams.    Advised monthly BSE

## 2025-02-05 RX ORDER — LOSARTAN POTASSIUM 50 MG/1
50 TABLET ORAL DAILY
Qty: 30 TABLET | Refills: 2 | Status: SHIPPED | OUTPATIENT
Start: 2025-02-05

## 2025-04-03 DIAGNOSIS — I10 ESSENTIAL HYPERTENSION: ICD-10-CM

## 2025-04-03 RX ORDER — LOSARTAN POTASSIUM 50 MG/1
50 TABLET ORAL DAILY
Qty: 90 TABLET | Refills: 0 | Status: SHIPPED | OUTPATIENT
Start: 2025-04-03

## 2025-04-03 NOTE — TELEPHONE ENCOUNTER
"Caller: Anabelle Coleman \"Olga Lidia\"    Relationship: Self    Best call back number: [phone]     What medication are you requesting: LOSARTAN - 90 DAY SUPPLY    What are your current symptoms: PATIENT STATES THEY WOULD LIKE A 90 DAY SUPPLY OF THE MEDICATION WITH REFILLS TO REDUCE TRIPS TO THE PHARMACY.     If a prescription is needed, what is your preferred pharmacy and phone number: [facility] [phone] - [place], [place] - [address] [facility] [phone] [facility] [phone] FX       "

## 2025-04-03 NOTE — TELEPHONE ENCOUNTER
When I saw her last in 2-2025, I switched her from ACE to ARB, her bp was up, was to monitor and drop off a log, did she? If not she needs to come by for a bp check, you can send a 90 day after you call her and see how she feels on the new Rx

## 2025-04-08 ENCOUNTER — CLINICAL SUPPORT (OUTPATIENT)
Dept: FAMILY MEDICINE CLINIC | Age: 65
End: 2025-04-08
Payer: COMMERCIAL

## 2025-04-08 VITALS — HEART RATE: 73 BPM | SYSTOLIC BLOOD PRESSURE: 143 MMHG | DIASTOLIC BLOOD PRESSURE: 84 MMHG

## 2025-04-14 ENCOUNTER — HOSPITAL ENCOUNTER (OUTPATIENT)
Dept: MAMMOGRAPHY | Facility: HOSPITAL | Age: 65
Discharge: HOME | End: 2025-04-14
Admitting: NURSE PRACTITIONER
Payer: COMMERCIAL

## 2025-04-14 DIAGNOSIS — Z12.31 SCREENING MAMMOGRAM FOR BREAST CANCER: ICD-10-CM

## 2025-04-14 PROCEDURE — 77067 SCR MAMMO BI INCL CAD: CPT

## 2025-04-14 PROCEDURE — 77063 BREAST TOMOSYNTHESIS BI: CPT

## 2025-04-15 ENCOUNTER — RESULTS FOLLOW-UP (OUTPATIENT)
Dept: FAMILY MEDICINE CLINIC | Age: 65
End: 2025-04-15
Payer: COMMERCIAL

## 2025-04-15 NOTE — LETTER
Anabelle Coleman  [address] [address]    April 15, 2025     Olga Lidia,     Your mammogram was stable.    Below are the results from your recent visit:    Resulted Orders   Mammo Screening Digital Tomosynthesis Bilateral With CAD    Narrative    MAMMO SCREENING DIGITAL TOMOSYNTHESIS BILATERAL W CAD-     Date of Exam: 4/14/2025 3:31 PM     Indication: Screening. Previous diagnostic mammogram recommended 6-month  follow-up benign cyst versus minimally complex cyst left breast 10:00. I  see no record of follow-up.     Comparison: 12/19/2023, diagnostic mammogram and left breast ultrasound  1/8/2024, 11/12/2019 screening exam.     Technique: Routine screening mammogram images were obtained per  protocol.  Tomosynthesis was utilized.  These mammographic images were  interpreted with the assistance of a computer aided detection system.     Breast Density: The breasts are heterogeneously dense, which may obscure  small masses.      Findings:   No suspicious masses, suspicious microcalcifications, or architectural  distortions are identified.  No change in a circumscribed mass in the posterior medial left breast  measuring about 1.1 cm.       Impression    No mammographic evidence of malignancy.  Recommend annual screening  mammography.     BI-RADS ASSESSMENT: Category 2: Benign     Note: It has been reported that there is approximately a 15% false  negative rate in mammography.  Therefore, management of a palpable  abnormality should not be deferred because of a negative mammogram.     4/15/2025 9:29 AM by Mary Vick MD on Workstation: [id number]              If you have any questions or concerns, please don't hesitate to call.         Sincerely,        GARCIA oCuch

## 2025-07-03 DIAGNOSIS — I10 ESSENTIAL HYPERTENSION: ICD-10-CM

## 2025-07-03 RX ORDER — LOSARTAN POTASSIUM 50 MG/1
50 TABLET ORAL DAILY
Qty: 90 TABLET | Refills: 0 | Status: SHIPPED | OUTPATIENT
Start: 2025-07-03

## 2025-07-07 ENCOUNTER — TRANSCRIBE ORDERS (OUTPATIENT)
Dept: ADMINISTRATIVE | Facility: HOSPITAL | Age: 65
End: 2025-07-07
Payer: COMMERCIAL

## 2025-07-07 DIAGNOSIS — D49.2 SOFT TISSUE TUMOR: Primary | ICD-10-CM

## 2025-07-09 ENCOUNTER — LAB (OUTPATIENT)
Dept: LAB | Facility: HOSPITAL | Age: 65
End: 2025-07-09
Payer: COMMERCIAL

## 2025-07-09 ENCOUNTER — OFFICE VISIT (OUTPATIENT)
Dept: FAMILY MEDICINE CLINIC | Age: 65
End: 2025-07-09
Payer: COMMERCIAL

## 2025-07-09 VITALS
HEART RATE: 68 BPM | HEIGHT: 61 IN | OXYGEN SATURATION: 97 % | TEMPERATURE: 98.2 F | SYSTOLIC BLOOD PRESSURE: 138 MMHG | WEIGHT: 143 LBS | DIASTOLIC BLOOD PRESSURE: 81 MMHG | BODY MASS INDEX: 27 KG/M2

## 2025-07-09 DIAGNOSIS — E78.2 MIXED HYPERLIPIDEMIA: ICD-10-CM

## 2025-07-09 DIAGNOSIS — I10 ESSENTIAL HYPERTENSION: Primary | ICD-10-CM

## 2025-07-09 DIAGNOSIS — E11.9 TYPE 2 DIABETES MELLITUS WITHOUT COMPLICATION, WITHOUT LONG-TERM CURRENT USE OF INSULIN: ICD-10-CM

## 2025-07-09 DIAGNOSIS — I10 ESSENTIAL HYPERTENSION: ICD-10-CM

## 2025-07-09 PROBLEM — R92.8 ABNORMAL MAMMOGRAM OF LEFT BREAST: Status: RESOLVED | Noted: 2024-01-24 | Resolved: 2025-07-09

## 2025-07-09 LAB
ALBUMIN SERPL-MCNC: 4.4 G/DL (ref 3.5–5.2)
ALBUMIN/GLOB SERPL: 1.4 G/DL
ALP SERPL-CCNC: 101 U/L (ref 39–117)
ALT SERPL W P-5'-P-CCNC: 30 U/L (ref 1–33)
ANION GAP SERPL CALCULATED.3IONS-SCNC: 13 MMOL/L (ref 5–15)
AST SERPL-CCNC: 22 U/L (ref 1–32)
BILIRUB SERPL-MCNC: 1.2 MG/DL (ref 0–1.2)
BUN SERPL-MCNC: 15 MG/DL (ref 8–23)
BUN/CREAT SERPL: 14.7 (ref 7–25)
CALCIUM SPEC-SCNC: 10.5 MG/DL (ref 8.6–10.5)
CHLORIDE SERPL-SCNC: 100 MMOL/L (ref 98–107)
CHOLEST SERPL-MCNC: 233 MG/DL (ref 0–200)
CO2 SERPL-SCNC: 25 MMOL/L (ref 22–29)
CREAT SERPL-MCNC: 1.02 MG/DL (ref 0.57–1)
EGFRCR SERPLBLD CKD-EPI 2021: 61.2 ML/MIN/1.73
GLOBULIN UR ELPH-MCNC: 3.1 GM/DL
GLUCOSE SERPL-MCNC: 142 MG/DL (ref 65–99)
HBA1C MFR BLD: 7.4 % (ref 4.8–5.6)
HDLC SERPL-MCNC: 31 MG/DL (ref 40–60)
LDLC SERPL CALC-MCNC: 131 MG/DL (ref 0–100)
LDLC/HDLC SERPL: 3.97 {RATIO}
POTASSIUM SERPL-SCNC: 4.4 MMOL/L (ref 3.5–5.2)
PROT SERPL-MCNC: 7.5 G/DL (ref 6–8.5)
SODIUM SERPL-SCNC: 138 MMOL/L (ref 136–145)
TRIGL SERPL-MCNC: 395 MG/DL (ref 0–150)
VLDLC SERPL-MCNC: 71 MG/DL (ref 5–40)

## 2025-07-09 PROCEDURE — 36415 COLL VENOUS BLD VENIPUNCTURE: CPT

## 2025-07-09 PROCEDURE — 80053 COMPREHEN METABOLIC PANEL: CPT

## 2025-07-09 PROCEDURE — 83036 HEMOGLOBIN GLYCOSYLATED A1C: CPT

## 2025-07-09 PROCEDURE — 80061 LIPID PANEL: CPT

## 2025-07-09 RX ORDER — LOSARTAN POTASSIUM 50 MG/1
50 TABLET ORAL DAILY
Qty: 90 TABLET | Refills: 1 | Status: SHIPPED | OUTPATIENT
Start: 2025-07-09

## 2025-07-09 NOTE — ASSESSMENT & PLAN NOTE
Hypertension is stable. advised to monitor BP at home. Continue current med. Continue to modify diet and lifestyle.  Had almond milk this am and yogurt. Sent on to lab     Orders:    Comprehensive Metabolic Panel; Future    losartan (COZAAR) 50 MG tablet; Take 1 tablet by mouth Daily.

## 2025-07-09 NOTE — ASSESSMENT & PLAN NOTE
Discussed diabetes, diet, exercise, checking sugars, she declines     Orders:    Comprehensive Metabolic Panel; Future    Lipid Panel; Future    Hemoglobin A1c; Future

## 2025-07-09 NOTE — PROGRESS NOTES
Chief Complaint  Hypertension (Follow up ) diabetes & HLD      Subjective          Anabelle Coleman presents to [facility]  History of Present Illness  Diabetes  Current medication:  none   Last eye exam:  UTD, seen in [facility]   At home BS ranges:  not checking  Lab Results       Component                Value               Date                       HGBA1C                   7.00 (H)            02/04/2025                Hypertension  Current medication:  losartan   Tolerating Medication:  yes  Checking BP at home and it is:  not checking   Needs refills:  yes, to [facility], took her last one yesterday   Labs:  Lab Results       Component                Value               Date                       GLUCOSE                  102 (H)             02/04/2025                 BUN                      19                  02/04/2025                 CREATININE               0.83                02/04/2025                 EGFRIFNONA               62                  10/20/2021                 EGFRIFAFRI               99                  04/18/2018                 BCR                      22.9                02/04/2025                 K                        4.3                 02/04/2025                 CO2                      26.0                02/04/2025                 CALCIUM                  9.3                 02/04/2025                 ALBUMIN                  4.3                 02/04/2025                 AST                      26                  02/04/2025                 ALT                      31                  02/04/2025              Hyperlipidemia  Current medication: none     Lab Results       Component                Value               Date                       CHOL                     216 (H)             02/04/2025                 CHLPL                    214 (H)             04/23/2020                 TRIG                     367 (H)             02/04/2025                  HDL                      31 (L)              2025                 LDL                      120 (H)             2025              Past Medical History: changes since 2025:             GYNECOLOGICAL HISTORY:        Contraception: S/P tubal ligation;    Menopause at age 52.    Last Pap was 2023 / hpv/ negative         PREVENTIVE HEALTH MAINTENANCE         Colonoscopy 6- polyp repeat in 5 years     Hepatitis C Medicare Screening: was last done 10/2008 negative           Surgical History:         cyst removed from breast;   Procedures: colonoscopy 12-31-10/hemorrhoids/diverticulosis         Family History:        Father:  84; Coronary Artery Disease ( stents placed/3 V CABG ); Hypertension and mild dementia     Mother:  late 70's, after a fall; Hypertension; Hyperlipidemia     Brother(s): 1 brother(s) total;  smoker     Sister(s): 4 sister(s) total; 1, was stillborn      Maternal Grandmother: ; Breast Cancer         Social History:        Occupation: [employer]     Marital Status:      Children: 2 children                     History reviewed. No pertinent past medical history.    No Known Allergies     Past Surgical History:   Procedure Laterality Date    COLONOSCOPY N/A 2010    COLONOSCOPY  06/10/2024    polyp, next due 5 years        Social History     Tobacco Use    Smoking status: Never     Passive exposure: Never    Smokeless tobacco: Never   Substance Use Topics    Alcohol use: Yes     Comment: rare       History reviewed. No pertinent family history.     Health Maintenance Due   Topic Date Due    DXA SCAN  Never done    DIABETIC EYE EXAM  Never done    Pneumococcal Vaccine 50+ (1 of 2 - PCV) Never done    TDAP/TD VACCINES (1 - Tdap) Never done    ZOSTER VACCINE (1 of 2) Never done    COVID-19 Vaccine ( season) 2024    URINE MICROALBUMIN-CREATININE RATIO (uACR)  2025    HEMOGLOBIN A1C   "08/04/2025        Current Outpatient Medications on File Prior to Visit   Medication Sig    [DISCONTINUED] losartan (COZAAR) 50 MG tablet TAKE 1 TABLET BY MOUTH DAILY     No current facility-administered medications on file prior to visit.       Immunization History   Administered Date(s) Administered    COVID-19 (MODERNA) 1st,2nd,3rd Dose Monovalent 02/05/2021, 03/05/2021    COVID-19 (PFIZER) Purple Cap Monovalent 12/14/2021    Flu Vaccine Intradermal Quad 18-64YR 11/09/2017    Flu Vaccine Quad PF >36MO 09/18/2020, 10/14/2021    Fluzone (or Fluarix & Flulaval for VFC) >6mos 09/18/2020, 10/14/2021, 10/17/2022, 12/13/2023    Hepatitis A 05/15/2018    Influenza, Unspecified 03/07/2022       Review of Systems   Constitutional:  Negative for fatigue and fever.   Respiratory:  Negative for cough and shortness of breath.    Cardiovascular:  Negative for chest pain, palpitations and leg swelling.   Musculoskeletal:         Swelling of left ankle, seeing podiatrist         Objective     Vitals:    07/09/25 0935   BP: 138/81   BP Location: Right arm   Patient Position: Sitting   Cuff Size: Adult   Pulse: 68   Temp: 98.2 °F (36.8 °C)   TempSrc: Oral   SpO2: 97%   Weight: 64.9 kg (143 lb)   Height: 155.6 cm (61.26\")            Physical Exam  Vitals reviewed.   Constitutional:       General: She is not in acute distress.     Appearance: Normal appearance.   Neck:      Vascular: No carotid bruit.   Cardiovascular:      Rate and Rhythm: Normal rate and regular rhythm.      Heart sounds: Normal heart sounds. No murmur heard.  Pulmonary:      Effort: Pulmonary effort is normal. No respiratory distress.      Breath sounds: Normal breath sounds.   Musculoskeletal:      Right lower leg: No edema.      Left lower leg: No edema.   Neurological:      Mental Status: She is alert.   Psychiatric:         Mood and Affect: Mood normal.         Behavior: Behavior normal.         Result Review :     The following data was reviewed by: Catherine" Lynn Burgos, GARCIA on 07/09/2025:                       Assessment and Plan      Assessment & Plan  Essential hypertension  Hypertension is stable. advised to monitor BP at home. Continue current med. Continue to modify diet and lifestyle.  Had almond milk this am and yogurt. Sent on to lab     Orders:    Comprehensive Metabolic Panel; Future    losartan (COZAAR) 50 MG tablet; Take 1 tablet by mouth Daily.    Mixed hyperlipidemia   Recheking labs, her weight is down, discussed healthy eating and regular exercise      Orders:    Comprehensive Metabolic Panel; Future    Lipid Panel; Future    Type 2 diabetes mellitus without complication, without long-term current use of insulin  Discussed diabetes, diet, exercise, checking sugars, she declines     Orders:    Comprehensive Metabolic Panel; Future    Lipid Panel; Future    Hemoglobin A1c; Future                        Follow Up     Return for followup pending lab results.    Patient was given instructions and counseling regarding her condition or for health maintenance advice. Please see specific information pulled into the AVS if appropriate.

## 2025-07-09 NOTE — ASSESSMENT & PLAN NOTE
Recheking labs, her weight is down, discussed healthy eating and regular exercise      Orders:    Comprehensive Metabolic Panel; Future    Lipid Panel; Future

## 2025-07-10 ENCOUNTER — RESULTS FOLLOW-UP (OUTPATIENT)
Dept: FAMILY MEDICINE CLINIC | Age: 65
End: 2025-07-10
Payer: COMMERCIAL

## 2025-08-05 ENCOUNTER — OFFICE VISIT (OUTPATIENT)
Dept: FAMILY MEDICINE CLINIC | Age: 65
End: 2025-08-05
Payer: COMMERCIAL

## 2025-08-05 VITALS
HEIGHT: 61 IN | TEMPERATURE: 98.1 F | WEIGHT: 144 LBS | OXYGEN SATURATION: 98 % | BODY MASS INDEX: 27.19 KG/M2 | HEART RATE: 64 BPM | SYSTOLIC BLOOD PRESSURE: 139 MMHG | DIASTOLIC BLOOD PRESSURE: 81 MMHG

## 2025-08-05 DIAGNOSIS — E11.9 TYPE 2 DIABETES MELLITUS WITHOUT COMPLICATION, WITHOUT LONG-TERM CURRENT USE OF INSULIN: ICD-10-CM

## 2025-08-05 DIAGNOSIS — E78.2 MIXED HYPERLIPIDEMIA: Primary | ICD-10-CM

## 2025-08-29 ENCOUNTER — LAB (OUTPATIENT)
Dept: LAB | Facility: HOSPITAL | Age: 65
End: 2025-08-29
Payer: COMMERCIAL

## 2025-08-29 DIAGNOSIS — E78.2 MIXED HYPERLIPIDEMIA: ICD-10-CM

## 2025-08-29 DIAGNOSIS — E11.9 TYPE 2 DIABETES MELLITUS WITHOUT COMPLICATION, WITHOUT LONG-TERM CURRENT USE OF INSULIN: ICD-10-CM

## 2025-08-29 LAB
ALBUMIN SERPL-MCNC: 4.3 G/DL (ref 3.5–5.2)
ALBUMIN/GLOB SERPL: 1.3 G/DL
ALP SERPL-CCNC: 93 U/L (ref 39–117)
ALT SERPL W P-5'-P-CCNC: 37 U/L (ref 1–33)
ANION GAP SERPL CALCULATED.3IONS-SCNC: 13 MMOL/L (ref 5–15)
AST SERPL-CCNC: 25 U/L (ref 1–32)
BILIRUB SERPL-MCNC: 1.5 MG/DL (ref 0–1.2)
BUN SERPL-MCNC: 15 MG/DL (ref 8–23)
BUN/CREAT SERPL: 19 (ref 7–25)
CALCIUM SPEC-SCNC: 10.1 MG/DL (ref 8.6–10.5)
CHLORIDE SERPL-SCNC: 102 MMOL/L (ref 98–107)
CHOLEST SERPL-MCNC: 208 MG/DL (ref 0–200)
CO2 SERPL-SCNC: 24 MMOL/L (ref 22–29)
CREAT SERPL-MCNC: 0.79 MG/DL (ref 0.57–1)
EGFRCR SERPLBLD CKD-EPI 2021: 83.1 ML/MIN/1.73
GLOBULIN UR ELPH-MCNC: 3.2 GM/DL
GLUCOSE SERPL-MCNC: 146 MG/DL (ref 65–99)
HDLC SERPL-MCNC: 36 MG/DL (ref 40–60)
LDLC SERPL CALC-MCNC: 136 MG/DL (ref 0–100)
LDLC/HDLC SERPL: 3.68 {RATIO}
POTASSIUM SERPL-SCNC: 4.5 MMOL/L (ref 3.5–5.2)
PROT SERPL-MCNC: 7.5 G/DL (ref 6–8.5)
SODIUM SERPL-SCNC: 139 MMOL/L (ref 136–145)
TRIGL SERPL-MCNC: 198 MG/DL (ref 0–150)
VLDLC SERPL-MCNC: 36 MG/DL (ref 5–40)

## 2025-08-29 PROCEDURE — 80053 COMPREHEN METABOLIC PANEL: CPT

## 2025-08-29 PROCEDURE — 80061 LIPID PANEL: CPT

## 2025-08-29 PROCEDURE — 36415 COLL VENOUS BLD VENIPUNCTURE: CPT
